# Patient Record
Sex: FEMALE | Race: WHITE | NOT HISPANIC OR LATINO | Employment: FULL TIME | URBAN - METROPOLITAN AREA
[De-identification: names, ages, dates, MRNs, and addresses within clinical notes are randomized per-mention and may not be internally consistent; named-entity substitution may affect disease eponyms.]

---

## 2023-07-01 LAB
ESTIMATED AVERAGE GLUCOSE FOR HBA1C: 160 MG/DL
HEMOGLOBIN A1C/HEMOGLOBIN TOTAL IN BLOOD: 7.2 %

## 2023-12-30 ENCOUNTER — LAB (OUTPATIENT)
Dept: LAB | Facility: LAB | Age: 58
End: 2023-12-30
Payer: COMMERCIAL

## 2023-12-30 DIAGNOSIS — E10.9 TYPE 1 DIABETES MELLITUS WITHOUT COMPLICATIONS (MULTI): Primary | ICD-10-CM

## 2023-12-30 PROCEDURE — 83036 HEMOGLOBIN GLYCOSYLATED A1C: CPT

## 2023-12-30 PROCEDURE — 36415 COLL VENOUS BLD VENIPUNCTURE: CPT

## 2023-12-31 LAB
EST. AVERAGE GLUCOSE BLD GHB EST-MCNC: 186 MG/DL
HBA1C MFR BLD: 8.1 %

## 2024-01-18 ENCOUNTER — OFFICE VISIT (OUTPATIENT)
Dept: ENDOCRINOLOGY | Facility: CLINIC | Age: 59
End: 2024-01-18
Payer: COMMERCIAL

## 2024-01-18 VITALS
RESPIRATION RATE: 20 BRPM | HEIGHT: 62 IN | WEIGHT: 230.16 LBS | DIASTOLIC BLOOD PRESSURE: 82 MMHG | HEART RATE: 96 BPM | SYSTOLIC BLOOD PRESSURE: 173 MMHG | BODY MASS INDEX: 42.35 KG/M2

## 2024-01-18 DIAGNOSIS — E10.9 TYPE 1 DIABETES MELLITUS WITHOUT COMPLICATION (MULTI): Primary | ICD-10-CM

## 2024-01-18 PROCEDURE — 1036F TOBACCO NON-USER: CPT | Performed by: INTERNAL MEDICINE

## 2024-01-18 PROCEDURE — 99214 OFFICE O/P EST MOD 30 MIN: CPT | Performed by: INTERNAL MEDICINE

## 2024-01-18 PROCEDURE — 3079F DIAST BP 80-89 MM HG: CPT | Performed by: INTERNAL MEDICINE

## 2024-01-18 PROCEDURE — 3077F SYST BP >= 140 MM HG: CPT | Performed by: INTERNAL MEDICINE

## 2024-01-18 RX ORDER — SIMVASTATIN 20 MG/1
20 TABLET, FILM COATED ORAL
COMMUNITY
End: 2024-01-23 | Stop reason: ALTCHOICE

## 2024-01-18 RX ORDER — MULTIVIT-MINERALS/FOLIC ACID 200 MCG
TABLET,CHEWABLE ORAL
COMMUNITY
End: 2024-01-23 | Stop reason: ALTCHOICE

## 2024-01-18 RX ORDER — TIZANIDINE 2 MG/1
2 TABLET ORAL EVERY 8 HOURS
COMMUNITY
Start: 2023-07-10 | End: 2024-01-23 | Stop reason: ALTCHOICE

## 2024-01-18 RX ORDER — MULTIVITAMIN
1 TABLET ORAL DAILY
COMMUNITY
Start: 2017-08-17

## 2024-01-18 RX ORDER — ACETAMINOPHEN 325 MG/1
TABLET ORAL EVERY 4 HOURS PRN
COMMUNITY
Start: 2019-11-11

## 2024-01-18 RX ORDER — ROSUVASTATIN CALCIUM 10 MG/1
10 TABLET, COATED ORAL DAILY
COMMUNITY

## 2024-01-18 RX ORDER — PANTOPRAZOLE SODIUM 20 MG/1
20 TABLET, DELAYED RELEASE ORAL
COMMUNITY
End: 2024-01-23 | Stop reason: ALTCHOICE

## 2024-01-18 RX ORDER — TRAVOPROST 0.04 MG/ML
SOLUTION/ DROPS OPHTHALMIC EVERY 24 HOURS
COMMUNITY
Start: 2018-12-14

## 2024-01-18 ASSESSMENT — ENCOUNTER SYMPTOMS
VOMITING: 0
ENDOCRINE COMMENTS: AS ABOVE
CONSTIPATION: 0
DIARRHEA: 0
NAUSEA: 0
SINUS PAIN: 1
UNEXPECTED WEIGHT CHANGE: 0

## 2024-01-18 ASSESSMENT — COLUMBIA-SUICIDE SEVERITY RATING SCALE - C-SSRS
2. HAVE YOU ACTUALLY HAD ANY THOUGHTS OF KILLING YOURSELF?: NO
1. IN THE PAST MONTH, HAVE YOU WISHED YOU WERE DEAD OR WISHED YOU COULD GO TO SLEEP AND NOT WAKE UP?: NO
6. HAVE YOU EVER DONE ANYTHING, STARTED TO DO ANYTHING, OR PREPARED TO DO ANYTHING TO END YOUR LIFE?: NO

## 2024-01-18 ASSESSMENT — PATIENT HEALTH QUESTIONNAIRE - PHQ9
2. FEELING DOWN, DEPRESSED OR HOPELESS: NOT AT ALL
SUM OF ALL RESPONSES TO PHQ9 QUESTIONS 1 AND 2: 0
1. LITTLE INTEREST OR PLEASURE IN DOING THINGS: NOT AT ALL

## 2024-01-18 ASSESSMENT — PAIN SCALES - GENERAL: PAINLEVEL: 0-NO PAIN

## 2024-01-18 NOTE — PROGRESS NOTES
History Of Present Illness  Stephanie Pierre is a 58 y.o. female     Duration of type 1 diabetes mellitus:  50 years  Complications:  cardiovascular disease    Recurring sinusitis for the past 2 months    Humulin N 40 units BID  Humalog 30-40 units before meals.     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    Surgical History  She has a past surgical history that includes Eye surgery (2017); Other surgical history (2017);  section, classic (2017); Other surgical history (2021); MR angio head wo IV contrast (11/10/2019); MR angio neck wo IV contrast (11/10/2019); CT angio head w and wo IV contrast (2023); and CT angio neck (2023).     Social History  She reports that she has never smoked. She has never used smokeless tobacco. She reports that she does not drink alcohol and does not use drugs.    Family History  No family history on file.    Medications  Current Outpatient Medications   Medication Instructions    acetaminophen (Tylenol) 325 mg tablet oral, Every 4 hours PRN    ascorbic acid (VITAMIN C) 100 mg, oral, Daily RT    aspirin (ASPIR-81 ORAL) Aspir-81    FOLIC ACID ORAL oral, Daily RT    metoprolol succinate XL (Kapspargo Sprinkle) 25 mg 24 hr capsule Every 12 hours    multivit with min-folic acid (One-A-Day VitaCraves) 200 mcg tablet,chewable Orally    multivitamin tablet 1 tablet, oral, Daily    pantoprazole (PROTONIX) 20 mg, oral, Daily RT    rosuvastatin (CRESTOR) 10 mg, oral, Daily    simvastatin (ZOCOR) 20 mg, oral, Daily RT    tiZANidine (ZANAFLEX) 2 mg, oral, Every 8 hours     "Travatan Z 0.004 % drops ophthalmic solution Every 24 hours       Allergies  Patient has no known allergies.    Review of Systems   Constitutional:  Negative for unexpected weight change (loss).   HENT:  Positive for sinus pain.    Eyes:  Negative for visual disturbance.   Cardiovascular:  Negative for chest pain.   Gastrointestinal:  Negative for constipation, diarrhea, nausea and vomiting.   Endocrine:        As above         Last Recorded Vitals  Blood pressure 173/82, pulse 96, resp. rate 20, height 1.575 m (5' 2\"), weight 104 kg (230 lb 2.6 oz).    Physical Exam  Constitutional:       General: She is not in acute distress.  HENT:      Head: Normocephalic.      Mouth/Throat:      Mouth: Mucous membranes are moist.   Eyes:      Extraocular Movements: Extraocular movements intact.   Neck:      Thyroid: No thyromegaly.   Cardiovascular:      Pulses:           Radial pulses are 2+ on the right side and 2+ on the left side.   Musculoskeletal:      Right lower leg: No edema.      Left lower leg: No edema.   Lymphadenopathy:      Cervical: No cervical adenopathy.   Neurological:      Mental Status: She is alert.      Motor: No tremor.   Psychiatric:         Mood and Affect: Affect normal.          Relevant Results  Glucose (mg/dL)   Date Value   07/09/2023 72 (L)   05/15/2021 175 (H)   02/29/2020 30 (LL)     Hemoglobin A1C (%)   Date Value   12/30/2023 8.1 (H)   07/01/2023 7.2 (A)   01/28/2023 6.6 (A)   07/18/2022 6.9 (A)     Bicarbonate (mmol/L)   Date Value   07/09/2023 32   05/15/2021 26   02/29/2020 31     Urea Nitrogen (mg/dL)   Date Value   07/09/2023 18   05/15/2021 20   02/29/2020 15     Creatinine (mg/dL)   Date Value   07/09/2023 0.75   05/15/2021 0.73   02/29/2020 0.65     Lab Results   Component Value Date    CHOL 146 01/14/2023    CHOL 149 05/15/2021    CHOL 152 02/29/2020     Lab Results   Component Value Date    HDL 52.4 01/14/2023    HDL 56.7 05/15/2021    HDL 55.2 02/29/2020     Lab Results   Component " Value Date    TRIG 115 01/14/2023    TRIG 149 05/15/2021    TRIG 151 (H) 02/29/2020     Lab Results   Component Value Date    TSH 2.25 05/15/2021       IMPRESSION  TYPE 1 DIABETES MELLITUS  Rising A1c with recent sinus infection    RECOMMENDATIONS  Continue current program  Labs as ordered by Dr. Dominguez  Bring written glucose record (2 weeks' worth) to all appointments.

## 2024-01-18 NOTE — PATIENT INSTRUCTIONS
RECOMMENDATIONS  Continue current program  Labs as ordered by Dr. Dominguez  Bring written glucose record (2 weeks' worth) to all appointments.

## 2024-01-23 ENCOUNTER — OFFICE VISIT (OUTPATIENT)
Dept: CARDIOLOGY | Facility: HOSPITAL | Age: 59
End: 2024-01-23
Payer: COMMERCIAL

## 2024-01-23 VITALS
BODY MASS INDEX: 42.58 KG/M2 | DIASTOLIC BLOOD PRESSURE: 59 MMHG | HEART RATE: 97 BPM | SYSTOLIC BLOOD PRESSURE: 147 MMHG | HEIGHT: 62 IN | WEIGHT: 231.4 LBS | OXYGEN SATURATION: 100 %

## 2024-01-23 DIAGNOSIS — I25.10 CORONARY ARTERY DISEASE, UNSPECIFIED VESSEL OR LESION TYPE, UNSPECIFIED WHETHER ANGINA PRESENT, UNSPECIFIED WHETHER NATIVE OR TRANSPLANTED HEART: Primary | ICD-10-CM

## 2024-01-23 LAB
ATRIAL RATE: 97 BPM
P AXIS: 62 DEGREES
P OFFSET: 199 MS
P ONSET: 151 MS
PR INTERVAL: 136 MS
Q ONSET: 219 MS
QRS COUNT: 16 BEATS
QRS DURATION: 76 MS
QT INTERVAL: 352 MS
QTC CALCULATION(BAZETT): 447 MS
QTC FREDERICIA: 413 MS
R AXIS: 47 DEGREES
T AXIS: 45 DEGREES
T OFFSET: 395 MS
VENTRICULAR RATE: 97 BPM

## 2024-01-23 PROCEDURE — 93005 ELECTROCARDIOGRAM TRACING: CPT | Performed by: NURSE PRACTITIONER

## 2024-01-23 PROCEDURE — 99214 OFFICE O/P EST MOD 30 MIN: CPT | Performed by: NURSE PRACTITIONER

## 2024-01-23 PROCEDURE — 93010 ELECTROCARDIOGRAM REPORT: CPT | Performed by: INTERNAL MEDICINE

## 2024-01-23 PROCEDURE — 1036F TOBACCO NON-USER: CPT | Performed by: NURSE PRACTITIONER

## 2024-01-23 RX ORDER — INSULIN LISPRO 100 [IU]/ML
INJECTION, SUSPENSION SUBCUTANEOUS
COMMUNITY

## 2024-01-23 RX ORDER — LISINOPRIL 2.5 MG/1
2.5 TABLET ORAL DAILY
COMMUNITY

## 2024-01-23 NOTE — PROGRESS NOTES
Subjective   Stephanie Pierre is a 58 y.o. female.    Chief Complaint:  Coronary Artery Disease    Mrs. Pierre returns for a 6 month follow up. She is seen in collaboration with Dr. Aragon. She has been feeling well from a cardiac standpoint. She has remained compliant with her medications, denying any intolerances. She remains fairly active with house work and walking, denying any exertional chest pain or shortness of breath. She denies any recent ER visits or hospitalizations. She offers no specific cardiovascular complaints or concerns today. She denies any complaints of chest pain, shortness of breath, lightheadedness, dizziness, palpitations, syncope, orthopnea, paroxysmal nocturnal dyspnea, lower extremity swelling or bleeding concerns.          Review of Systems   All other systems reviewed and are negative.      Objective   Physical Exam  Constitutional:       Appearance: Healthy appearance. In no distress  Pulmonary:      Effort: Pulmonary effort is normal.      Breath sounds: Normal breath sounds.   Cardiovascular:      Normal rate. Regular rhythm. Normal S1. Normal S2.       Murmurs: There is no murmur.      Carotids: right carotid pulse +2, no bruit heard over the right carotid. left carotid pulse +2, no bruit heard over the left carotid.  Edema:     Peripheral edema absent.   Abdominal:      Palpations: Abdomen is soft.   Musculoskeletal:       Cervical back: Normal range of motion.   Skin:     General: Skin is warm and dry. Normal color and pigmentation   Neurological:      Mental Status: Alert and oriented to person, place and time.   Psychiatric:     Mood and Affect: appropriate mood and appropriate affect.     EKG obtained and reviewed. Normal sinus rhythm. Anterior infarct, age undetermined. HR 97    Lab Review:   Lab Results   Component Value Date     07/09/2023    K 4.7 07/09/2023    CL 97 (L) 07/09/2023    CO2 32 07/09/2023    BUN 18 07/09/2023    CREATININE 0.75 07/09/2023    GLUCOSE  72 (L) 07/09/2023    CALCIUM 10.1 07/09/2023     Lab Results   Component Value Date    WBC 11.6 (H) 07/09/2023    HGB 14.6 07/09/2023    HCT 43.8 07/09/2023    MCV 93 07/09/2023     07/09/2023     Lab Results   Component Value Date    CHOL 146 01/14/2023    TRIG 115 01/14/2023    HDL 52.4 01/14/2023       Assessment/Plan   Mrs. Pierre is a 58 year old  female with a past medical history significant for hyperlipidemia, Type I Diabetes and CAD/NSTEMI s/p RCA stent 9/2018. Echocardiogram 9/2018 showed a hyperdynamic LV with an EF of 70% and no significant valvular disease. She presents today for routine follow up stable from a cardiac standpoint. Her VS and EKG remain stable. She remains on appropriate antiplatelet and lipid lowering therapy with her LDL at goal. She seems to be getting around reasonably well, denying any exertional intolerances. I will have her continue all medications unchanged. She will follow up with us in clinic in one year. She knows to call for any concerns.

## 2024-04-04 ENCOUNTER — APPOINTMENT (OUTPATIENT)
Dept: ENDOCRINOLOGY | Facility: CLINIC | Age: 59
End: 2024-04-04
Payer: COMMERCIAL

## 2024-04-27 ENCOUNTER — LAB (OUTPATIENT)
Dept: LAB | Facility: LAB | Age: 59
End: 2024-04-27
Payer: COMMERCIAL

## 2024-04-27 DIAGNOSIS — E10.39: ICD-10-CM

## 2024-04-27 DIAGNOSIS — E10.59 TYPE 1 DIABETES MELLITUS WITH OTHER CIRCULATORY COMPLICATIONS (MULTI): ICD-10-CM

## 2024-04-27 DIAGNOSIS — I25.10 ATHEROSCLEROTIC HEART DISEASE OF NATIVE CORONARY ARTERY WITHOUT ANGINA PECTORIS: Primary | ICD-10-CM

## 2024-04-27 DIAGNOSIS — E78.5 HYPERLIPIDEMIA, UNSPECIFIED: ICD-10-CM

## 2024-04-27 PROCEDURE — 80061 LIPID PANEL: CPT

## 2024-04-27 PROCEDURE — 83036 HEMOGLOBIN GLYCOSYLATED A1C: CPT

## 2024-04-27 PROCEDURE — 80053 COMPREHEN METABOLIC PANEL: CPT

## 2024-04-27 PROCEDURE — 85025 COMPLETE CBC W/AUTO DIFF WBC: CPT

## 2024-04-27 PROCEDURE — 36415 COLL VENOUS BLD VENIPUNCTURE: CPT

## 2024-04-27 PROCEDURE — 83721 ASSAY OF BLOOD LIPOPROTEIN: CPT

## 2024-04-28 LAB
ALBUMIN SERPL BCP-MCNC: 4.2 G/DL (ref 3.4–5)
ALP SERPL-CCNC: 89 U/L (ref 33–110)
ALT SERPL W P-5'-P-CCNC: 20 U/L (ref 7–45)
ANION GAP SERPL CALC-SCNC: 15 MMOL/L (ref 10–20)
AST SERPL W P-5'-P-CCNC: 16 U/L (ref 9–39)
BASOPHILS # BLD AUTO: 0.02 X10*3/UL (ref 0–0.1)
BASOPHILS NFR BLD AUTO: 0.2 %
BILIRUB SERPL-MCNC: 0.5 MG/DL (ref 0–1.2)
BUN SERPL-MCNC: 21 MG/DL (ref 6–23)
CALCIUM SERPL-MCNC: 9.6 MG/DL (ref 8.6–10.6)
CHLORIDE SERPL-SCNC: 98 MMOL/L (ref 98–107)
CHOLEST SERPL-MCNC: 154 MG/DL (ref 0–199)
CHOLESTEROL/HDL RATIO: 2.8
CO2 SERPL-SCNC: 29 MMOL/L (ref 21–32)
CREAT SERPL-MCNC: 0.73 MG/DL (ref 0.5–1.05)
EGFRCR SERPLBLD CKD-EPI 2021: >90 ML/MIN/1.73M*2
EOSINOPHIL # BLD AUTO: 0.11 X10*3/UL (ref 0–0.7)
EOSINOPHIL NFR BLD AUTO: 1.3 %
ERYTHROCYTE [DISTWIDTH] IN BLOOD BY AUTOMATED COUNT: 12 % (ref 11.5–14.5)
EST. AVERAGE GLUCOSE BLD GHB EST-MCNC: 209 MG/DL
GLUCOSE SERPL-MCNC: 256 MG/DL (ref 74–99)
HBA1C MFR BLD: 8.9 %
HCT VFR BLD AUTO: 42.8 % (ref 36–46)
HDLC SERPL-MCNC: 55.6 MG/DL
HGB BLD-MCNC: 14.4 G/DL (ref 12–16)
IMM GRANULOCYTES # BLD AUTO: 0.03 X10*3/UL (ref 0–0.7)
IMM GRANULOCYTES NFR BLD AUTO: 0.3 % (ref 0–0.9)
LDLC SERPL CALC-MCNC: 73 MG/DL
LDLC SERPL DIRECT ASSAY-MCNC: 82 MG/DL (ref 0–129)
LYMPHOCYTES # BLD AUTO: 2.23 X10*3/UL (ref 1.2–4.8)
LYMPHOCYTES NFR BLD AUTO: 26 %
MCH RBC QN AUTO: 31.4 PG (ref 26–34)
MCHC RBC AUTO-ENTMCNC: 33.6 G/DL (ref 32–36)
MCV RBC AUTO: 93 FL (ref 80–100)
MONOCYTES # BLD AUTO: 0.68 X10*3/UL (ref 0.1–1)
MONOCYTES NFR BLD AUTO: 7.9 %
NEUTROPHILS # BLD AUTO: 5.51 X10*3/UL (ref 1.2–7.7)
NEUTROPHILS NFR BLD AUTO: 64.3 %
NON HDL CHOLESTEROL: 98 MG/DL (ref 0–149)
NRBC BLD-RTO: 0 /100 WBCS (ref 0–0)
PLATELET # BLD AUTO: 266 X10*3/UL (ref 150–450)
POTASSIUM SERPL-SCNC: 5.1 MMOL/L (ref 3.5–5.3)
PROT SERPL-MCNC: 7.2 G/DL (ref 6.4–8.2)
RBC # BLD AUTO: 4.58 X10*6/UL (ref 4–5.2)
SODIUM SERPL-SCNC: 137 MMOL/L (ref 136–145)
TRIGL SERPL-MCNC: 128 MG/DL (ref 0–149)
VLDL: 26 MG/DL (ref 0–40)
WBC # BLD AUTO: 8.6 X10*3/UL (ref 4.4–11.3)

## 2024-05-04 ENCOUNTER — LAB (OUTPATIENT)
Dept: LAB | Facility: LAB | Age: 59
End: 2024-05-04
Payer: COMMERCIAL

## 2024-05-04 LAB
CREAT UR-MCNC: 90.4 MG/DL (ref 20–320)
MICROALBUMIN UR-MCNC: 68.2 MG/L
MICROALBUMIN/CREAT UR: 75.4 UG/MG CREAT

## 2024-05-04 PROCEDURE — 82043 UR ALBUMIN QUANTITATIVE: CPT

## 2024-05-04 PROCEDURE — 82570 ASSAY OF URINE CREATININE: CPT

## 2024-06-15 DIAGNOSIS — E10.9 TYPE 1 DIABETES MELLITUS WITHOUT COMPLICATION (MULTI): Primary | ICD-10-CM

## 2024-06-18 ENCOUNTER — HOSPITAL ENCOUNTER (EMERGENCY)
Facility: HOSPITAL | Age: 59
Discharge: HOME | End: 2024-06-18
Attending: EMERGENCY MEDICINE
Payer: COMMERCIAL

## 2024-06-18 VITALS
BODY MASS INDEX: 44.16 KG/M2 | WEIGHT: 240 LBS | HEIGHT: 62 IN | DIASTOLIC BLOOD PRESSURE: 79 MMHG | HEART RATE: 106 BPM | TEMPERATURE: 97.5 F | OXYGEN SATURATION: 95 % | SYSTOLIC BLOOD PRESSURE: 169 MMHG | RESPIRATION RATE: 17 BRPM

## 2024-06-18 DIAGNOSIS — K52.9 GASTROENTERITIS: Primary | ICD-10-CM

## 2024-06-18 LAB
ALBUMIN SERPL BCP-MCNC: 4.2 G/DL (ref 3.4–5)
ALP SERPL-CCNC: 113 U/L (ref 33–110)
ALT SERPL W P-5'-P-CCNC: 36 U/L (ref 7–45)
ANION GAP SERPL CALC-SCNC: 14 MMOL/L (ref 10–20)
AST SERPL W P-5'-P-CCNC: 37 U/L (ref 9–39)
BASE EXCESS BLDV CALC-SCNC: 2.5 MMOL/L (ref -2–3)
BASOPHILS # BLD AUTO: 0.02 X10*3/UL (ref 0–0.1)
BASOPHILS NFR BLD AUTO: 0.1 %
BILIRUB SERPL-MCNC: 0.6 MG/DL (ref 0–1.2)
BODY TEMPERATURE: 37 DEGREES CELSIUS
BUN SERPL-MCNC: 18 MG/DL (ref 6–23)
CALCIUM SERPL-MCNC: 9 MG/DL (ref 8.6–10.3)
CHLORIDE SERPL-SCNC: 98 MMOL/L (ref 98–107)
CO2 SERPL-SCNC: 29 MMOL/L (ref 21–32)
CREAT SERPL-MCNC: 0.75 MG/DL (ref 0.5–1.05)
EGFRCR SERPLBLD CKD-EPI 2021: >90 ML/MIN/1.73M*2
EOSINOPHIL # BLD AUTO: 0.1 X10*3/UL (ref 0–0.7)
EOSINOPHIL NFR BLD AUTO: 0.7 %
ERYTHROCYTE [DISTWIDTH] IN BLOOD BY AUTOMATED COUNT: 12.2 % (ref 11.5–14.5)
GLUCOSE BLD MANUAL STRIP-MCNC: 120 MG/DL (ref 74–99)
GLUCOSE BLD MANUAL STRIP-MCNC: 185 MG/DL (ref 74–99)
GLUCOSE SERPL-MCNC: 126 MG/DL (ref 74–99)
HCO3 BLDV-SCNC: 27.9 MMOL/L (ref 22–26)
HCT VFR BLD AUTO: 45.2 % (ref 36–46)
HGB BLD-MCNC: 15.4 G/DL (ref 12–16)
IMM GRANULOCYTES # BLD AUTO: 0.06 X10*3/UL (ref 0–0.7)
IMM GRANULOCYTES NFR BLD AUTO: 0.4 % (ref 0–0.9)
INHALED O2 CONCENTRATION: 21 %
LYMPHOCYTES # BLD AUTO: 0.61 X10*3/UL (ref 1.2–4.8)
LYMPHOCYTES NFR BLD AUTO: 4.4 %
MCH RBC QN AUTO: 32.3 PG (ref 26–34)
MCHC RBC AUTO-ENTMCNC: 34.1 G/DL (ref 32–36)
MCV RBC AUTO: 95 FL (ref 80–100)
MONOCYTES # BLD AUTO: 0.65 X10*3/UL (ref 0.1–1)
MONOCYTES NFR BLD AUTO: 4.7 %
NEUTROPHILS # BLD AUTO: 12.3 X10*3/UL (ref 1.2–7.7)
NEUTROPHILS NFR BLD AUTO: 89.7 %
NRBC BLD-RTO: 0 /100 WBCS (ref 0–0)
OXYHGB MFR BLDV: 74 % (ref 45–75)
PCO2 BLDV: 45 MM HG (ref 41–51)
PH BLDV: 7.4 PH (ref 7.33–7.43)
PLATELET # BLD AUTO: 260 X10*3/UL (ref 150–450)
PO2 BLDV: 46 MM HG (ref 35–45)
POTASSIUM SERPL-SCNC: 4.6 MMOL/L (ref 3.5–5.3)
PROT SERPL-MCNC: 7.5 G/DL (ref 6.4–8.2)
RBC # BLD AUTO: 4.77 X10*6/UL (ref 4–5.2)
SAO2 % BLDV: 76 % (ref 45–75)
SODIUM SERPL-SCNC: 136 MMOL/L (ref 136–145)
WBC # BLD AUTO: 13.7 X10*3/UL (ref 4.4–11.3)

## 2024-06-18 PROCEDURE — 80053 COMPREHEN METABOLIC PANEL: CPT | Performed by: NURSE PRACTITIONER

## 2024-06-18 PROCEDURE — 96374 THER/PROPH/DIAG INJ IV PUSH: CPT

## 2024-06-18 PROCEDURE — 82805 BLOOD GASES W/O2 SATURATION: CPT | Performed by: NURSE PRACTITIONER

## 2024-06-18 PROCEDURE — 99284 EMERGENCY DEPT VISIT MOD MDM: CPT | Mod: 25

## 2024-06-18 PROCEDURE — 85025 COMPLETE CBC W/AUTO DIFF WBC: CPT | Performed by: NURSE PRACTITIONER

## 2024-06-18 PROCEDURE — 36415 COLL VENOUS BLD VENIPUNCTURE: CPT | Performed by: NURSE PRACTITIONER

## 2024-06-18 PROCEDURE — 2500000004 HC RX 250 GENERAL PHARMACY W/ HCPCS (ALT 636 FOR OP/ED): Performed by: EMERGENCY MEDICINE

## 2024-06-18 PROCEDURE — 82947 ASSAY GLUCOSE BLOOD QUANT: CPT

## 2024-06-18 PROCEDURE — 96361 HYDRATE IV INFUSION ADD-ON: CPT

## 2024-06-18 RX ORDER — INSULIN HUMAN 100 [IU]/ML
INJECTION, SUSPENSION SUBCUTANEOUS
Qty: 90 ML | Refills: 3 | Status: SHIPPED | OUTPATIENT
Start: 2024-06-18

## 2024-06-18 RX ORDER — ONDANSETRON HYDROCHLORIDE 2 MG/ML
4 INJECTION, SOLUTION INTRAVENOUS ONCE
Status: COMPLETED | OUTPATIENT
Start: 2024-06-18 | End: 2024-06-18

## 2024-06-18 ASSESSMENT — COLUMBIA-SUICIDE SEVERITY RATING SCALE - C-SSRS
2. HAVE YOU ACTUALLY HAD ANY THOUGHTS OF KILLING YOURSELF?: NO
6. HAVE YOU EVER DONE ANYTHING, STARTED TO DO ANYTHING, OR PREPARED TO DO ANYTHING TO END YOUR LIFE?: NO
1. IN THE PAST MONTH, HAVE YOU WISHED YOU WERE DEAD OR WISHED YOU COULD GO TO SLEEP AND NOT WAKE UP?: NO

## 2024-06-18 ASSESSMENT — PAIN - FUNCTIONAL ASSESSMENT: PAIN_FUNCTIONAL_ASSESSMENT: 0-10

## 2024-06-18 ASSESSMENT — PAIN SCALES - GENERAL: PAINLEVEL_OUTOF10: 0 - NO PAIN

## 2024-06-18 NOTE — ED PROVIDER NOTES
HPI   Chief Complaint   Patient presents with    Diarrhea    Abdominal Pain       This is a 58-year-old female who presents to the emergency department complaining of diarrhea.  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  The patient reports nausea but no vomiting.  Her diarrhea has been nonbloody.  She denies fevers and chills.  She denies abdominal pain.  She is concerned about her diabetes as she is not able to eat due to the nausea.    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, antibodies, or inhibitors (Multi)     Factor V inhibitor disorder    Personal history of other diseases of the circulatory system     History of arterial occlusion     Past Surgical History:   Procedure Laterality Date     SECTION, CLASSIC  2017     Section    CT ANGIO NECK  2023    CT NECK ANGIO W AND WO IV CONTRAST 2023 [facility] CT    CT HEAD ANGIO W AND WO IV CONTRAST  2023    CT HEAD ANGIO W AND WO IV CONTRAST 2023 [facility] CT    EYE SURGERY  2017    Eye Surgery    MR HEAD ANGIO WO IV CONTRAST  11/10/2019    MR HEAD ANGIO WO IV CONTRAST 11/10/2019 [facility]    MR NECK ANGIO WO IV CONTRAST  11/10/2019    MR NECK ANGIO WO IV CONTRAST 11/10/2019 [facility]    OTHER SURGICAL HISTORY  2017    Oral Surgery Tooth Extraction [facility]    OTHER SURGICAL HISTORY  2021    Cholecystectomy     No family history on file.  Social History     Tobacco Use    Smoking status: Never    Smokeless tobacco: Never   Substance Use Topics    Alcohol use: Never    Drug use: Never       Physical Exam   ED Triage Vitals [24 1619]    Temperature Heart Rate Respirations BP   36.4 °C (97.5 °F) (!) 113 18 (!) 183/102      Pulse Ox Temp Source Heart Rate Source Patient Position   97 % Tympanic Monitor Sitting      BP Location FiO2 (%)     Left arm --       Physical Exam  Vitals and nursing note reviewed.   HENT:      Head: Normocephalic and atraumatic.      Nose: Nose normal.   Eyes:      Conjunctiva/sclera: Conjunctivae normal.   Cardiovascular:      Rate and Rhythm: Normal rate and regular rhythm.      Pulses: Normal pulses.      Heart sounds: Normal heart sounds.   Pulmonary:      Effort: Pulmonary effort is normal.      Breath sounds: Normal breath sounds.   Abdominal:      General: Bowel sounds are normal.      Palpations: Abdomen is soft.   Musculoskeletal:         General: Normal range of motion.      Cervical back: Normal range of motion and neck supple.   Skin:     Findings: No rash.   Neurological:      General: No focal deficit present.      Mental Status: She is alert and oriented to person, place, and time.   Psychiatric:         Mood and Affect: Mood normal.         ED Course & MDM   Diagnoses as of 06/18/24 2152   Gastroenteritis       Medical Decision Making  Differential diagnosis: I have considered the following conditions during my assessment of this patient's condition: Gastritis, gastroenteritis, GERD, food poisoning, ileus, bowel obstruction, hernia, acute appendicitis, cholecystitis, diverticulitis, colitis, renal colic.    This is a 58-year-old diabetic who presents emergency department with diarrhea.  She has no abdominal pain.  She has nausea but no vomiting.  She will be treated with IV fluids and Zofran.  Labs show no evidence of DKA.  pH is normal at 7.40.  Anion gap is normal at 14.  Bicarb is normal at 29.  The patient's white blood count is elevated at 13.7.  She has no abdominal tenderness.  Doubt acute bacterial infection.  The patient felt much improved after IV fluids.  She was able to tolerate p.o. in the  emergency department.  She is appropriate for discharge and outpatient follow-up.        Procedure  Procedures     Norman Pearl MD  06/18/24 1182

## 2024-06-18 NOTE — ED TRIAGE NOTES
Pt states that she was around kids that had a stomach bug yesterday. Pt states that she started having diarrhea around 11:30am, pt states that she is nauseated and unable to eat.

## 2024-06-18 NOTE — ED TRIAGE NOTES
TRIAGE NOTE   I saw the patient as the Clinician in Triage and performed a brief history and physical exam, established acuity, and ordered appropriate tests to develop basic plan of care. Patient will be seen by an [facility], resident and/or physician who will independently evaluate the patient. Please see subsequent provider notes for further details and disposition.     Brief HPI: In brief, Stephanie Pierre is a 58 y.o. female that presents for diarrhea.  Type I diabetic.  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  She started with diarrhea today.  States blood sugar at home 188.  Denies abdominal pain, vomiting or fevers.    Focused Physical exam:   Sinus Tachycardia at 115  RRR  Abdomen soft and nondistended    Plan/MDM:   CBC  CMP   urinalysis   VBG      Please see subsequent provider note for further details and disposition

## 2024-06-18 NOTE — Clinical Note
Stephanie Pierre was seen and treated in our emergency department on 6/18/2024.  She may return to work on 06/19/2024.       If you have any questions or concerns, please don't hesitate to call.      Norman Pearl MD

## 2024-07-23 ENCOUNTER — TELEPHONE (OUTPATIENT)
Dept: ENDOCRINOLOGY | Facility: CLINIC | Age: 59
End: 2024-07-23
Payer: COMMERCIAL

## 2024-07-23 DIAGNOSIS — E10.9 TYPE 1 DIABETES MELLITUS WITHOUT COMPLICATION (MULTI): Primary | ICD-10-CM

## 2024-07-23 NOTE — TELEPHONE ENCOUNTER
Pt called office to verify if A1c Lab needed to be completed prior to fuv on 8/1/24. Provider verbally confirmed A1c to be completed on/after 7/27/24 and before office visit.

## 2024-07-27 ENCOUNTER — LAB (OUTPATIENT)
Dept: LAB | Facility: LAB | Age: 59
End: 2024-07-27
Payer: COMMERCIAL

## 2024-07-27 DIAGNOSIS — E10.9 TYPE 1 DIABETES MELLITUS WITHOUT COMPLICATION (MULTI): ICD-10-CM

## 2024-07-27 LAB
EST. AVERAGE GLUCOSE BLD GHB EST-MCNC: 206 MG/DL
HBA1C MFR BLD: 8.8 %

## 2024-07-27 PROCEDURE — 36415 COLL VENOUS BLD VENIPUNCTURE: CPT

## 2024-07-27 PROCEDURE — 83036 HEMOGLOBIN GLYCOSYLATED A1C: CPT

## 2024-08-01 ENCOUNTER — APPOINTMENT (OUTPATIENT)
Dept: ENDOCRINOLOGY | Facility: CLINIC | Age: 59
End: 2024-08-01
Payer: COMMERCIAL

## 2024-08-01 VITALS
BODY MASS INDEX: 41.53 KG/M2 | SYSTOLIC BLOOD PRESSURE: 161 MMHG | HEART RATE: 112 BPM | DIASTOLIC BLOOD PRESSURE: 76 MMHG | WEIGHT: 227.07 LBS

## 2024-08-01 DIAGNOSIS — E10.9 TYPE 1 DIABETES MELLITUS WITHOUT COMPLICATION (MULTI): Primary | ICD-10-CM

## 2024-08-01 PROCEDURE — 3048F LDL-C <100 MG/DL: CPT | Performed by: INTERNAL MEDICINE

## 2024-08-01 PROCEDURE — 1036F TOBACCO NON-USER: CPT | Performed by: INTERNAL MEDICINE

## 2024-08-01 PROCEDURE — 3078F DIAST BP <80 MM HG: CPT | Performed by: INTERNAL MEDICINE

## 2024-08-01 PROCEDURE — 99214 OFFICE O/P EST MOD 30 MIN: CPT | Performed by: INTERNAL MEDICINE

## 2024-08-01 PROCEDURE — 3060F POS MICROALBUMINURIA REV: CPT | Performed by: INTERNAL MEDICINE

## 2024-08-01 PROCEDURE — 3052F HG A1C>EQUAL 8.0%<EQUAL 9.0%: CPT | Performed by: INTERNAL MEDICINE

## 2024-08-01 PROCEDURE — 3077F SYST BP >= 140 MM HG: CPT | Performed by: INTERNAL MEDICINE

## 2024-08-01 PROCEDURE — 4010F ACE/ARB THERAPY RXD/TAKEN: CPT | Performed by: INTERNAL MEDICINE

## 2024-08-01 ASSESSMENT — ENCOUNTER SYMPTOMS
ARTHRALGIAS: 0
APPETITE CHANGE: 0
HEADACHES: 0
POLYDIPSIA: 0
SHORTNESS OF BREATH: 0
VOMITING: 0
FREQUENCY: 0
NERVOUS/ANXIOUS: 0
COUGH: 0
FEVER: 0
CONSTIPATION: 0
ABDOMINAL PAIN: 0
SORE THROAT: 0
NAUSEA: 1
DIARRHEA: 1

## 2024-08-01 NOTE — LETTER
2024     Victor Hugo Dominguez MD  [address]  [address]  [place] [address]    Patient: Stephanie Pierre   YOB: 1965   Date of Visit: 2024       Dear Dr. Victor Hugo Dominguez MD:    Thank you for referring Stephanie Pierre to me for evaluation. Below are my notes for this consultation.  If you have questions, please do not hesitate to call me. I look forward to following your patient along with you.       Sincerely,     Hunter Rodriguez MD      CC: No Recipients  ______________________________________________________________________________________    History Of Present Illness  Stephanie Pierre is a 58 y.o. female     Duration of type 1 diabetes mellitus:  50 years  Complications:  cardiovascular disease     History of GI illness, seen at [facility] ED in   Recurring sinusitis    Humulin N   Breakfast 40 units  Bedtime 42-44 units    Humalog 30-40 units before meals.      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    Surgical History  She has a past surgical history that includes Eye surgery (2017); Other surgical history (2017);  section, classic (2017); Other surgical history (2021); MR angio head wo IV contrast (11/10/2019); MR angio neck wo IV contrast (11/10/2019); CT angio head w and wo IV contrast (2023); and CT angio neck (2023).     Social History  She reports that she has never smoked. She has never used smokeless tobacco. She reports that she does not drink alcohol and does not use drugs.    Family History  No family history on file.    Medications  Current  Outpatient Medications   Medication Instructions   • acetaminophen (Tylenol) 325 mg tablet oral, Every 4 hours PRN   • aspirin (ASPIR-81 ORAL) Aspir-81   • HumuLIN N NPH U-100 Insulin 100 unit/mL injection INJECT 40 UNITS SUBCUTANEOUSLY TWICE DAILY   • insulin lispro protamin-lispro (HumaLOG Mix 50-50 KwikPen) 100 unit/mL (50-50) injection subcutaneous, 2 times daily (morning and late afternoon), Take as directed per insulin instructions.   • insulin NPH hum/reg insulin hm (HUMULIN 50/50 SUBQ) subcutaneous, Take as directed per insulin instructions.   • lisinopril 2.5 mg, oral, Daily   • metoprolol succinate XL (KAPSPARGO SPRINKLE) 12.5 mg, oral, Every 12 hours   • multivitamin tablet 1 tablet, oral, Daily   • rosuvastatin (CRESTOR) 10 mg, oral, Daily   • Travatan Z 0.004 % drops ophthalmic solution Every 24 hours       Allergies  Benzoin, Cefaclor, Clarithromycin, Clindamycin, Pseudoephedrine, Sulfamethoxazole-trimethoprim, Brimonidine-timolol, Insulin glargine, Penicillins, Phenobarbital, and Phenytoin    Review of Systems   Constitutional:  Negative for appetite change and fever.   HENT:  Negative for sore throat.         Denies dry mouth   Eyes:  Negative for visual disturbance.   Respiratory:  Negative for cough and shortness of breath.    Cardiovascular:  Negative for chest pain.   Gastrointestinal:  Positive for diarrhea and nausea (resolved). Negative for abdominal pain, constipation and vomiting.   Endocrine: Negative for polydipsia and polyuria.   Genitourinary:  Negative for frequency.   Musculoskeletal:  Negative for arthralgias.   Skin:  Negative for rash.   Neurological:  Negative for headaches.   Psychiatric/Behavioral:  The patient is not nervous/anxious.          Last Recorded Vitals  Blood pressure 161/76, pulse (!) 112, weight 103 kg (227 lb 1.2 oz).    Physical Exam  Constitutional:       General: She is not in acute distress.     Appearance: She is obese.   HENT:      Head: Normocephalic.       Mouth/Throat:      Mouth: Mucous membranes are moist.   Eyes:      Extraocular Movements: Extraocular movements intact.   Neck:      Thyroid: No thyroid mass or thyromegaly.   Cardiovascular:      Pulses:           Radial pulses are 2+ on the right side and 2+ on the left side.   Musculoskeletal:      Right lower leg: No edema.      Left lower leg: No edema.   Lymphadenopathy:      Cervical: No cervical adenopathy.   Neurological:      Mental Status: She is alert.      Motor: No tremor.   Psychiatric:         Mood and Affect: Affect normal.          Relevant Results  Glucose (mg/dL)   Date Value   06/18/2024 126 (H)   04/27/2024 256 (H)   07/09/2023 72 (L)   05/15/2021 175 (H)   02/29/2020 30 (LL)     Hemoglobin A1C (%)   Date Value   07/27/2024 8.8 (H)   04/27/2024 8.9 (H)   12/30/2023 8.1 (H)     Bicarbonate (mmol/L)   Date Value   06/18/2024 29   04/27/2024 29   07/09/2023 32   05/15/2021 26   02/29/2020 31     Urea Nitrogen (mg/dL)   Date Value   06/18/2024 18   04/27/2024 21   07/09/2023 18   05/15/2021 20   02/29/2020 15     Creatinine (mg/dL)   Date Value   06/18/2024 0.75   04/27/2024 0.73   07/09/2023 0.75   05/15/2021 0.73   02/29/2020 0.65     Lab Results   Component Value Date    CHOL 154 04/27/2024    CHOL 146 01/14/2023    CHOL 149 05/15/2021     Lab Results   Component Value Date    HDL 55.6 04/27/2024    HDL 52.4 01/14/2023    HDL 56.7 05/15/2021     Lab Results   Component Value Date    LDLCALC 73 04/27/2024     Lab Results   Component Value Date    TRIG 128 04/27/2024    TRIG 115 01/14/2023    TRIG 149 05/15/2021     Lab Results   Component Value Date    TSH 2.25 05/15/2021       IMPRESSION  TYPE 1 DIABETES MELLITUS WITH HYPERGLYCEMIA  A1c not improved  Stated FBG elevation per patient recall  Recent infections    RECOMMENDATIONS  Increase night NPH dose to 48 units  If fasting glucose is over 160 for 5 days, increase night NPH another 2 units.    Follow up 3 months  Bring written glucose record (2  weeks' worth) to all appointments.     A1c before next appointment

## 2024-08-01 NOTE — PROGRESS NOTES
History Of Present Illness  Stephanie Pierre is a 58 y.o. female     Duration of type 1 diabetes mellitus:  50 years  Complications:  cardiovascular disease     History of GI illness, seen at [facility] ED in   Recurring sinusitis    Humulin N   Breakfast 40 units  Bedtime 42-44 units    Humalog 30-40 units before meals.      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    Surgical History  She has a past surgical history that includes Eye surgery (2017); Other surgical history (2017);  section, classic (2017); Other surgical history (2021); MR angio head wo IV contrast (11/10/2019); MR angio neck wo IV contrast (11/10/2019); CT angio head w and wo IV contrast (2023); and CT angio neck (2023).     Social History  She reports that she has never smoked. She has never used smokeless tobacco. She reports that she does not drink alcohol and does not use drugs.    Family History  No family history on file.    Medications  Current Outpatient Medications   Medication Instructions    acetaminophen (Tylenol) 325 mg tablet oral, Every 4 hours PRN    aspirin (ASPIR-81 ORAL) Aspir-81    HumuLIN N NPH U-100 Insulin 100 unit/mL injection INJECT 40 UNITS SUBCUTANEOUSLY TWICE DAILY    insulin lispro protamin-lispro (HumaLOG Mix 50-50 KwikPen) 100 unit/mL (50-50) injection subcutaneous, 2 times daily (morning and late afternoon), Take as directed per insulin instructions.    insulin NPH hum/reg insulin hm (HUMULIN 50/50 SUBQ) subcutaneous, Take as directed per insulin instructions.    lisinopril 2.5 mg, oral, Daily     metoprolol succinate XL (KAPSPARGO SPRINKLE) 12.5 mg, oral, Every 12 hours    multivitamin tablet 1 tablet, oral, Daily    rosuvastatin (CRESTOR) 10 mg, oral, Daily    Travatan Z 0.004 % drops ophthalmic solution Every 24 hours       Allergies  Benzoin, Cefaclor, Clarithromycin, Clindamycin, Pseudoephedrine, Sulfamethoxazole-trimethoprim, Brimonidine-timolol, Insulin glargine, Penicillins, Phenobarbital, and Phenytoin    Review of Systems   Constitutional:  Negative for appetite change and fever.   HENT:  Negative for sore throat.         Denies dry mouth   Eyes:  Negative for visual disturbance.   Respiratory:  Negative for cough and shortness of breath.    Cardiovascular:  Negative for chest pain.   Gastrointestinal:  Positive for diarrhea and nausea (resolved). Negative for abdominal pain, constipation and vomiting.   Endocrine: Negative for polydipsia and polyuria.   Genitourinary:  Negative for frequency.   Musculoskeletal:  Negative for arthralgias.   Skin:  Negative for rash.   Neurological:  Negative for headaches.   Psychiatric/Behavioral:  The patient is not nervous/anxious.          Last Recorded Vitals  Blood pressure 161/76, pulse (!) 112, weight 103 kg (227 lb 1.2 oz).    Physical Exam  Constitutional:       General: She is not in acute distress.     Appearance: She is obese.   HENT:      Head: Normocephalic.      Mouth/Throat:      Mouth: Mucous membranes are moist.   Eyes:      Extraocular Movements: Extraocular movements intact.   Neck:      Thyroid: No thyroid mass or thyromegaly.   Cardiovascular:      Pulses:           Radial pulses are 2+ on the right side and 2+ on the left side.   Musculoskeletal:      Right lower leg: No edema.      Left lower leg: No edema.   Lymphadenopathy:      Cervical: No cervical adenopathy.   Neurological:      Mental Status: She is alert.      Motor: No tremor.   Psychiatric:         Mood and Affect: Affect normal.          Relevant Results  Glucose (mg/dL)   Date  Value   06/18/2024 126 (H)   04/27/2024 256 (H)   07/09/2023 72 (L)   05/15/2021 175 (H)   02/29/2020 30 (LL)     Hemoglobin A1C (%)   Date Value   07/27/2024 8.8 (H)   04/27/2024 8.9 (H)   12/30/2023 8.1 (H)     Bicarbonate (mmol/L)   Date Value   06/18/2024 29   04/27/2024 29   07/09/2023 32   05/15/2021 26   02/29/2020 31     Urea Nitrogen (mg/dL)   Date Value   06/18/2024 18   04/27/2024 21   07/09/2023 18   05/15/2021 20   02/29/2020 15     Creatinine (mg/dL)   Date Value   06/18/2024 0.75   04/27/2024 0.73   07/09/2023 0.75   05/15/2021 0.73   02/29/2020 0.65     Lab Results   Component Value Date    CHOL 154 04/27/2024    CHOL 146 01/14/2023    CHOL 149 05/15/2021     Lab Results   Component Value Date    HDL 55.6 04/27/2024    HDL 52.4 01/14/2023    HDL 56.7 05/15/2021     Lab Results   Component Value Date    LDLCALC 73 04/27/2024     Lab Results   Component Value Date    TRIG 128 04/27/2024    TRIG 115 01/14/2023    TRIG 149 05/15/2021     Lab Results   Component Value Date    TSH 2.25 05/15/2021       IMPRESSION  TYPE 1 DIABETES MELLITUS WITH HYPERGLYCEMIA  A1c not improved  Stated FBG elevation per patient recall  Recent infections    RECOMMENDATIONS  Increase night NPH dose to 48 units  If fasting glucose is over 160 for 5 days, increase night NPH another 2 units.    Follow up 3 months  Bring written glucose record (2 weeks' worth) to all appointments.     A1c before next appointment

## 2024-08-01 NOTE — PATIENT INSTRUCTIONS
RECOMMENDATIONS  Increase night NPH dose to 48 units  If fasting glucose is over 160 for 5 days, increase night NPH another 2 units.    Follow up 3 months  Bring written glucose record (2 weeks' worth) to all appointments.     A1c before next appointment

## 2024-08-24 DIAGNOSIS — I25.10 ATHEROSCLEROTIC HEART DISEASE OF NATIVE CORONARY ARTERY WITHOUT ANGINA PECTORIS: ICD-10-CM

## 2024-08-26 RX ORDER — METOPROLOL TARTRATE 25 MG/1
12.5 TABLET, FILM COATED ORAL 2 TIMES DAILY
Qty: 90 TABLET | Refills: 3 | Status: SHIPPED | OUTPATIENT
Start: 2024-08-26 | End: 2025-08-26

## 2024-08-31 DIAGNOSIS — E10.9 TYPE 1 DIABETES MELLITUS WITHOUT COMPLICATION (MULTI): Primary | ICD-10-CM

## 2024-08-31 RX ORDER — INSULIN LISPRO 100 [IU]/ML
INJECTION, SOLUTION INTRAVENOUS; SUBCUTANEOUS
Qty: 120 ML | Refills: 3 | Status: SHIPPED | OUTPATIENT
Start: 2024-08-31

## 2024-11-16 ENCOUNTER — LAB (OUTPATIENT)
Dept: LAB | Facility: LAB | Age: 59
End: 2024-11-16
Payer: COMMERCIAL

## 2024-11-16 DIAGNOSIS — E10.9 TYPE 1 DIABETES MELLITUS WITHOUT COMPLICATION: ICD-10-CM

## 2024-11-16 PROCEDURE — 83036 HEMOGLOBIN GLYCOSYLATED A1C: CPT

## 2024-11-16 PROCEDURE — 36415 COLL VENOUS BLD VENIPUNCTURE: CPT

## 2024-11-17 LAB
EST. AVERAGE GLUCOSE BLD GHB EST-MCNC: 194 MG/DL
HBA1C MFR BLD: 8.4 %

## 2024-11-21 ENCOUNTER — APPOINTMENT (OUTPATIENT)
Dept: ENDOCRINOLOGY | Facility: CLINIC | Age: 59
End: 2024-11-21
Payer: COMMERCIAL

## 2024-11-21 VITALS
WEIGHT: 233.69 LBS | HEART RATE: 98 BPM | BODY MASS INDEX: 42.74 KG/M2 | DIASTOLIC BLOOD PRESSURE: 73 MMHG | SYSTOLIC BLOOD PRESSURE: 153 MMHG

## 2024-11-21 DIAGNOSIS — E10.9 TYPE 1 DIABETES MELLITUS WITHOUT COMPLICATION: ICD-10-CM

## 2024-11-21 PROCEDURE — 3060F POS MICROALBUMINURIA REV: CPT | Performed by: INTERNAL MEDICINE

## 2024-11-21 PROCEDURE — 3052F HG A1C>EQUAL 8.0%<EQUAL 9.0%: CPT | Performed by: INTERNAL MEDICINE

## 2024-11-21 PROCEDURE — 1036F TOBACCO NON-USER: CPT | Performed by: INTERNAL MEDICINE

## 2024-11-21 PROCEDURE — 4010F ACE/ARB THERAPY RXD/TAKEN: CPT | Performed by: INTERNAL MEDICINE

## 2024-11-21 PROCEDURE — 3078F DIAST BP <80 MM HG: CPT | Performed by: INTERNAL MEDICINE

## 2024-11-21 PROCEDURE — 3077F SYST BP >= 140 MM HG: CPT | Performed by: INTERNAL MEDICINE

## 2024-11-21 PROCEDURE — 99214 OFFICE O/P EST MOD 30 MIN: CPT | Performed by: INTERNAL MEDICINE

## 2024-11-21 PROCEDURE — 3048F LDL-C <100 MG/DL: CPT | Performed by: INTERNAL MEDICINE

## 2024-11-21 RX ORDER — INSULIN LISPRO 100 [IU]/ML
30-40 INJECTION, SOLUTION INTRAVENOUS; SUBCUTANEOUS
Qty: 110 ML | Refills: 3 | Status: SHIPPED | OUTPATIENT
Start: 2024-11-21

## 2024-11-21 RX ORDER — INSULIN HUMAN 100 [IU]/ML
40-48 INJECTION, SUSPENSION SUBCUTANEOUS 2 TIMES DAILY
Qty: 80 ML | Refills: 3 | Status: SHIPPED | OUTPATIENT
Start: 2024-11-21 | End: 2025-11-21

## 2024-11-21 ASSESSMENT — ENCOUNTER SYMPTOMS
DIARRHEA: 0
CONSTIPATION: 0
VOMITING: 0
APPETITE CHANGE: 0
HEADACHES: 0
ABDOMINAL PAIN: 0
NERVOUS/ANXIOUS: 0
SORE THROAT: 0
NAUSEA: 0
SHORTNESS OF BREATH: 0
COUGH: 0
ARTHRALGIAS: 0
POLYDIPSIA: 0
FREQUENCY: 0
FEVER: 0

## 2024-11-21 NOTE — LETTER
2024     Victor Hugo Dominguez MD  [address]  [address]  [place] [address]    Patient: Stephanie Pierre   YOB: 1965   Date of Visit: 2024       Dear Dr. Victor Hugo Dominguez MD:    Thank you for referring Stephanie Pierre to me for evaluation. Below are my notes for this consultation.  If you have questions, please do not hesitate to call me. I look forward to following your patient along with you.       Sincerely,     Hunter Rdoriguez MD      CC: No Recipients  ______________________________________________________________________________________    History Of Present Illness  Stephanie Pierre is a 59 y.o. female     Duration of type 1 diabetes mellitus:  50 years  Complications:  cardiovascular disease    Work stress, day care    Humulin N   Breakfast 40 units  Bedtime 46-48 units     Humalog 30-40 units before meals.      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    Surgical History  She has a past surgical history that includes Eye surgery (2017); Other surgical history (2017);  section, classic (2017); Other surgical history (2021); MR angio head wo IV contrast (11/10/2019); MR angio neck wo IV contrast (11/10/2019); CT angio head w and wo IV contrast (2023); and CT angio neck (2023).     Social History  She reports that she has never smoked. She has never used smokeless tobacco. She reports that she does not drink alcohol and does not use drugs.    Family History  No family history on file.    Medications  Current Outpatient Medications    Medication Instructions   • acetaminophen (Tylenol) 325 mg tablet Every 4 hours PRN   • aspirin (ASPIR-81 ORAL) Aspir-81   • HumaLOG U-100 Insulin 100 unit/mL injection INJECT 30-40 UNITS UNDER THE SKIN THREE TIMES A DAY AS DIRECTED   • HumuLIN N NPH U-100 Insulin 100 unit/mL injection INJECT 40 UNITS SUBCUTANEOUSLY TWICE DAILY   • insulin lispro protamin-lispro (HumaLOG Mix 50-50 KwikPen) 100 unit/mL (50-50) injection 2 times daily (morning and late afternoon)   • insulin NPH hum/reg insulin hm (HUMULIN 50/50 SUBQ) Inject under the skin. Take as directed per insulin instructions.   • lisinopril 2.5 mg, Daily   • metoprolol succinate XL (KAPSPARGO SPRINKLE) 12.5 mg, Every 12 hours   • metoprolol tartrate (LOPRESSOR) 12.5 mg, oral, 2 times daily   • multivitamin tablet 1 tablet, Daily   • rosuvastatin (CRESTOR) 10 mg, Daily   • Travatan Z 0.004 % drops ophthalmic solution Every 24 hours       Allergies  Benzoin, Cefaclor, Clarithromycin, Clindamycin, Pseudoephedrine, Sulfamethoxazole-trimethoprim, Brimonidine-timolol, Insulin glargine, Penicillins, Phenobarbital, and Phenytoin    Review of Systems   Constitutional:  Negative for appetite change and fever.   HENT:  Negative for sore throat.         Denies dry mouth   Eyes:  Negative for visual disturbance.   Respiratory:  Negative for cough and shortness of breath.    Cardiovascular:  Negative for chest pain.   Gastrointestinal:  Negative for abdominal pain, constipation, diarrhea, nausea and vomiting.   Endocrine: Negative for polydipsia and polyuria.   Genitourinary:  Negative for frequency.   Musculoskeletal:  Negative for arthralgias.   Skin:  Negative for rash.   Neurological:  Negative for headaches.   Psychiatric/Behavioral:  The patient is not nervous/anxious.          Last Recorded Vitals  Blood pressure 153/73, pulse 98, weight 106 kg (233 lb 11 oz).    Physical Exam  Constitutional:       General: She is not in acute distress.     Appearance: She is  obese.   HENT:      Head: Normocephalic.      Mouth/Throat:      Mouth: Mucous membranes are moist.   Eyes:      Extraocular Movements: Extraocular movements intact.   Neck:      Thyroid: No thyroid mass or thyromegaly.   Cardiovascular:      Pulses:           Radial pulses are 2+ on the right side and 2+ on the left side.   Musculoskeletal:      Right lower leg: No edema.      Left lower leg: No edema.   Lymphadenopathy:      Cervical: No cervical adenopathy.   Neurological:      Mental Status: She is alert.      Motor: No tremor.   Psychiatric:         Mood and Affect: Affect normal.          Relevant Results  Glucose (mg/dL)   Date Value   06/18/2024 126 (H)   04/27/2024 256 (H)   07/09/2023 72 (L)   05/15/2021 175 (H)   02/29/2020 30 (LL)     Hemoglobin A1C (%)   Date Value   11/16/2024 8.4 (H)   07/27/2024 8.8 (H)   04/27/2024 8.9 (H)     Bicarbonate (mmol/L)   Date Value   06/18/2024 29   04/27/2024 29   07/09/2023 32   05/15/2021 26   02/29/2020 31     Urea Nitrogen (mg/dL)   Date Value   06/18/2024 18   04/27/2024 21   07/09/2023 18   05/15/2021 20   02/29/2020 15     Creatinine (mg/dL)   Date Value   06/18/2024 0.75   04/27/2024 0.73   07/09/2023 0.75   05/15/2021 0.73   02/29/2020 0.65       IMPRESSION  TYPE 1 DIABETES MELLITUS  A1c improving  No glucose records provided      RECOMMENDATIONS  Follow up 3 months  Bring written glucose record (2 weeks' worth) to all appointments.      A1c before next appointment

## 2024-11-21 NOTE — PATIENT INSTRUCTIONS
RECOMMENDATIONS  Follow up 3 months  Bring written glucose record (2 weeks' worth) to all appointments.      A1c before next appointment

## 2024-11-21 NOTE — PROGRESS NOTES
History Of Present Illness  Stephanie Pierre is a 59 y.o. female     Duration of type 1 diabetes mellitus:  50 years  Complications:  cardiovascular disease    Work stress, day care    Humulin N   Breakfast 40 units  Bedtime 46-48 units     Humalog 30-40 units before meals.      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    Surgical History  She has a past surgical history that includes Eye surgery (2017); Other surgical history (2017);  section, classic (2017); Other surgical history (2021); MR angio head wo IV contrast (11/10/2019); MR angio neck wo IV contrast (11/10/2019); CT angio head w and wo IV contrast (2023); and CT angio neck (2023).     Social History  She reports that she has never smoked. She has never used smokeless tobacco. She reports that she does not drink alcohol and does not use drugs.    Family History  No family history on file.    Medications  Current Outpatient Medications   Medication Instructions    acetaminophen (Tylenol) 325 mg tablet Every 4 hours PRN    aspirin (ASPIR-81 ORAL) Aspir-81    HumaLOG U-100 Insulin 100 unit/mL injection INJECT 30-40 UNITS UNDER THE SKIN THREE TIMES A DAY AS DIRECTED    HumuLIN N NPH U-100 Insulin 100 unit/mL injection INJECT 40 UNITS SUBCUTANEOUSLY TWICE DAILY    insulin lispro protamin-lispro (HumaLOG Mix 50-50 KwikPen) 100 unit/mL (50-50) injection 2 times daily (morning and late afternoon)    insulin NPH hum/reg insulin hm (HUMULIN 50/50 SUBQ) Inject under the skin. Take as directed per insulin instructions.     lisinopril 2.5 mg, Daily    metoprolol succinate XL (KAPSPARGO SPRINKLE) 12.5 mg, Every 12 hours    metoprolol tartrate (LOPRESSOR) 12.5 mg, oral, 2 times daily    multivitamin tablet 1 tablet, Daily    rosuvastatin (CRESTOR) 10 mg, Daily    Travatan Z 0.004 % drops ophthalmic solution Every 24 hours       Allergies  Benzoin, Cefaclor, Clarithromycin, Clindamycin, Pseudoephedrine, Sulfamethoxazole-trimethoprim, Brimonidine-timolol, Insulin glargine, Penicillins, Phenobarbital, and Phenytoin    Review of Systems   Constitutional:  Negative for appetite change and fever.   HENT:  Negative for sore throat.         Denies dry mouth   Eyes:  Negative for visual disturbance.   Respiratory:  Negative for cough and shortness of breath.    Cardiovascular:  Negative for chest pain.   Gastrointestinal:  Negative for abdominal pain, constipation, diarrhea, nausea and vomiting.   Endocrine: Negative for polydipsia and polyuria.   Genitourinary:  Negative for frequency.   Musculoskeletal:  Negative for arthralgias.   Skin:  Negative for rash.   Neurological:  Negative for headaches.   Psychiatric/Behavioral:  The patient is not nervous/anxious.          Last Recorded Vitals  Blood pressure 153/73, pulse 98, weight 106 kg (233 lb 11 oz).    Physical Exam  Constitutional:       General: She is not in acute distress.     Appearance: She is obese.   HENT:      Head: Normocephalic.      Mouth/Throat:      Mouth: Mucous membranes are moist.   Eyes:      Extraocular Movements: Extraocular movements intact.   Neck:      Thyroid: No thyroid mass or thyromegaly.   Cardiovascular:      Pulses:           Radial pulses are 2+ on the right side and 2+ on the left side.   Musculoskeletal:      Right lower leg: No edema.      Left lower leg: No edema.   Lymphadenopathy:      Cervical: No cervical adenopathy.   Neurological:      Mental Status: She is alert.      Motor: No tremor.   Psychiatric:         Mood and Affect: Affect normal.           Relevant Results  Glucose (mg/dL)   Date Value   06/18/2024 126 (H)   04/27/2024 256 (H)   07/09/2023 72 (L)   05/15/2021 175 (H)   02/29/2020 30 (LL)     Hemoglobin A1C (%)   Date Value   11/16/2024 8.4 (H)   07/27/2024 8.8 (H)   04/27/2024 8.9 (H)     Bicarbonate (mmol/L)   Date Value   06/18/2024 29   04/27/2024 29   07/09/2023 32   05/15/2021 26   02/29/2020 31     Urea Nitrogen (mg/dL)   Date Value   06/18/2024 18   04/27/2024 21   07/09/2023 18   05/15/2021 20   02/29/2020 15     Creatinine (mg/dL)   Date Value   06/18/2024 0.75   04/27/2024 0.73   07/09/2023 0.75   05/15/2021 0.73   02/29/2020 0.65       IMPRESSION  TYPE 1 DIABETES MELLITUS  A1c improving  No glucose records provided      RECOMMENDATIONS  Follow up 3 months  Bring written glucose record (2 weeks' worth) to all appointments.      A1c before next appointment

## 2024-12-01 ENCOUNTER — APPOINTMENT (OUTPATIENT)
Dept: CARDIOLOGY | Facility: HOSPITAL | Age: 59
End: 2024-12-01
Payer: COMMERCIAL

## 2024-12-01 ENCOUNTER — HOSPITAL ENCOUNTER (EMERGENCY)
Facility: HOSPITAL | Age: 59
Discharge: HOME | End: 2024-12-01
Attending: EMERGENCY MEDICINE
Payer: COMMERCIAL

## 2024-12-01 ENCOUNTER — APPOINTMENT (OUTPATIENT)
Dept: RADIOLOGY | Facility: HOSPITAL | Age: 59
End: 2024-12-01
Payer: COMMERCIAL

## 2024-12-01 VITALS
OXYGEN SATURATION: 99 % | WEIGHT: 240 LBS | TEMPERATURE: 97.7 F | DIASTOLIC BLOOD PRESSURE: 52 MMHG | BODY MASS INDEX: 44.16 KG/M2 | HEART RATE: 95 BPM | SYSTOLIC BLOOD PRESSURE: 144 MMHG | HEIGHT: 62 IN | RESPIRATION RATE: 17 BRPM

## 2024-12-01 DIAGNOSIS — R74.8 ELEVATED LIVER ENZYMES: Primary | ICD-10-CM

## 2024-12-01 DIAGNOSIS — N39.0 URINARY TRACT INFECTION WITHOUT HEMATURIA, SITE UNSPECIFIED: ICD-10-CM

## 2024-12-01 DIAGNOSIS — R11.2 NAUSEA AND VOMITING, UNSPECIFIED VOMITING TYPE: ICD-10-CM

## 2024-12-01 LAB
ALBUMIN SERPL BCP-MCNC: 4 G/DL (ref 3.4–5)
ALP SERPL-CCNC: 103 U/L (ref 33–110)
ALT SERPL W P-5'-P-CCNC: 79 U/L (ref 7–45)
ANION GAP BLDV CALCULATED.4IONS-SCNC: 5 MMOL/L (ref 10–25)
ANION GAP SERPL CALC-SCNC: 13 MMOL/L (ref 10–20)
APPEARANCE UR: ABNORMAL
AST SERPL W P-5'-P-CCNC: 130 U/L (ref 9–39)
BACTERIA #/AREA URNS AUTO: ABNORMAL /HPF
BASE EXCESS BLDV CALC-SCNC: 6.7 MMOL/L (ref -2–3)
BASOPHILS # BLD AUTO: 0.02 X10*3/UL (ref 0–0.1)
BASOPHILS NFR BLD AUTO: 0.1 %
BILIRUB SERPL-MCNC: 0.5 MG/DL (ref 0–1.2)
BILIRUB UR STRIP.AUTO-MCNC: NEGATIVE MG/DL
BODY TEMPERATURE: 37 DEGREES CELSIUS
BUN SERPL-MCNC: 20 MG/DL (ref 6–23)
CA-I BLDV-SCNC: 1.2 MMOL/L (ref 1.1–1.33)
CALCIUM SERPL-MCNC: 8.4 MG/DL (ref 8.6–10.3)
CARDIAC TROPONIN I PNL SERPL HS: 3 NG/L (ref 0–13)
CARDIAC TROPONIN I PNL SERPL HS: 3 NG/L (ref 0–13)
CHLORIDE BLDV-SCNC: 100 MMOL/L (ref 98–107)
CHLORIDE SERPL-SCNC: 92 MMOL/L (ref 98–107)
CO2 SERPL-SCNC: 29 MMOL/L (ref 21–32)
COLOR UR: YELLOW
CREAT SERPL-MCNC: 0.77 MG/DL (ref 0.5–1.05)
EGFRCR SERPLBLD CKD-EPI 2021: 90 ML/MIN/1.73M*2
EOSINOPHIL # BLD AUTO: 0.02 X10*3/UL (ref 0–0.7)
EOSINOPHIL NFR BLD AUTO: 0.1 %
ERYTHROCYTE [DISTWIDTH] IN BLOOD BY AUTOMATED COUNT: 11.9 % (ref 11.5–14.5)
FLUAV RNA RESP QL NAA+PROBE: NOT DETECTED
FLUBV RNA RESP QL NAA+PROBE: NOT DETECTED
GLUCOSE BLD MANUAL STRIP-MCNC: 94 MG/DL (ref 74–99)
GLUCOSE BLDV-MCNC: 139 MG/DL (ref 74–99)
GLUCOSE SERPL-MCNC: 125 MG/DL (ref 74–99)
GLUCOSE UR STRIP.AUTO-MCNC: ABNORMAL MG/DL
HCO3 BLDV-SCNC: 33.4 MMOL/L (ref 22–26)
HCT VFR BLD AUTO: 44.3 % (ref 36–46)
HCT VFR BLD EST: 47 % (ref 36–46)
HGB BLD-MCNC: 14.8 G/DL (ref 12–16)
HGB BLDV-MCNC: 15.8 G/DL (ref 12–16)
HOLD SPECIMEN: NORMAL
IMM GRANULOCYTES # BLD AUTO: 0.07 X10*3/UL (ref 0–0.7)
IMM GRANULOCYTES NFR BLD AUTO: 0.4 % (ref 0–0.9)
INHALED O2 CONCENTRATION: 22 %
KETONES UR STRIP.AUTO-MCNC: ABNORMAL MG/DL
LACTATE BLDV-SCNC: 2.2 MMOL/L (ref 0.4–2)
LACTATE SERPL-SCNC: 1.9 MMOL/L (ref 0.4–2)
LEUKOCYTE ESTERASE UR QL STRIP.AUTO: ABNORMAL
LIPASE SERPL-CCNC: 35 U/L (ref 9–82)
LYMPHOCYTES # BLD AUTO: 0.44 X10*3/UL (ref 1.2–4.8)
LYMPHOCYTES NFR BLD AUTO: 2.6 %
MAGNESIUM SERPL-MCNC: 1.81 MG/DL (ref 1.6–2.4)
MCH RBC QN AUTO: 31.2 PG (ref 26–34)
MCHC RBC AUTO-ENTMCNC: 33.4 G/DL (ref 32–36)
MCV RBC AUTO: 94 FL (ref 80–100)
MONOCYTES # BLD AUTO: 0.91 X10*3/UL (ref 0.1–1)
MONOCYTES NFR BLD AUTO: 5.5 %
MUCOUS THREADS #/AREA URNS AUTO: ABNORMAL /LPF
NEUTROPHILS # BLD AUTO: 15.22 X10*3/UL (ref 1.2–7.7)
NEUTROPHILS NFR BLD AUTO: 91.3 %
NITRITE UR QL STRIP.AUTO: NEGATIVE
NRBC BLD-RTO: 0 /100 WBCS (ref 0–0)
OXYHGB MFR BLDV: 51.1 % (ref 45–75)
PCO2 BLDV: 54 MM HG (ref 41–51)
PH BLDV: 7.4 PH (ref 7.33–7.43)
PH UR STRIP.AUTO: 5.5 [PH]
PLATELET # BLD AUTO: 265 X10*3/UL (ref 150–450)
PO2 BLDV: 33 MM HG (ref 35–45)
POTASSIUM BLDV-SCNC: 4.6 MMOL/L (ref 3.5–5.3)
POTASSIUM SERPL-SCNC: 4.2 MMOL/L (ref 3.5–5.3)
PROT SERPL-MCNC: 7.5 G/DL (ref 6.4–8.2)
PROT UR STRIP.AUTO-MCNC: ABNORMAL MG/DL
RBC # BLD AUTO: 4.74 X10*6/UL (ref 4–5.2)
RBC # UR STRIP.AUTO: ABNORMAL /UL
RBC #/AREA URNS AUTO: ABNORMAL /HPF
SAO2 % BLDV: 52 % (ref 45–75)
SARS-COV-2 RNA RESP QL NAA+PROBE: NOT DETECTED
SODIUM BLDV-SCNC: 134 MMOL/L (ref 136–145)
SODIUM SERPL-SCNC: 130 MMOL/L (ref 136–145)
SP GR UR STRIP.AUTO: 1.03
SQUAMOUS #/AREA URNS AUTO: ABNORMAL /HPF
UROBILINOGEN UR STRIP.AUTO-MCNC: NORMAL MG/DL
WBC # BLD AUTO: 16.7 X10*3/UL (ref 4.4–11.3)
WBC #/AREA URNS AUTO: ABNORMAL /HPF

## 2024-12-01 PROCEDURE — 2500000004 HC RX 250 GENERAL PHARMACY W/ HCPCS (ALT 636 FOR OP/ED): Performed by: EMERGENCY MEDICINE

## 2024-12-01 PROCEDURE — 87086 URINE CULTURE/COLONY COUNT: CPT | Mod: AHULAB | Performed by: EMERGENCY MEDICINE

## 2024-12-01 PROCEDURE — 82947 ASSAY GLUCOSE BLOOD QUANT: CPT

## 2024-12-01 PROCEDURE — 83605 ASSAY OF LACTIC ACID: CPT | Performed by: EMERGENCY MEDICINE

## 2024-12-01 PROCEDURE — 99285 EMERGENCY DEPT VISIT HI MDM: CPT | Mod: 25

## 2024-12-01 PROCEDURE — 96374 THER/PROPH/DIAG INJ IV PUSH: CPT

## 2024-12-01 PROCEDURE — 84132 ASSAY OF SERUM POTASSIUM: CPT | Performed by: EMERGENCY MEDICINE

## 2024-12-01 PROCEDURE — 36415 COLL VENOUS BLD VENIPUNCTURE: CPT | Performed by: EMERGENCY MEDICINE

## 2024-12-01 PROCEDURE — 74177 CT ABD & PELVIS W/CONTRAST: CPT

## 2024-12-01 PROCEDURE — 83690 ASSAY OF LIPASE: CPT | Performed by: EMERGENCY MEDICINE

## 2024-12-01 PROCEDURE — 87636 SARSCOV2 & INF A&B AMP PRB: CPT | Performed by: EMERGENCY MEDICINE

## 2024-12-01 PROCEDURE — 85025 COMPLETE CBC W/AUTO DIFF WBC: CPT | Performed by: EMERGENCY MEDICINE

## 2024-12-01 PROCEDURE — 74177 CT ABD & PELVIS W/CONTRAST: CPT | Mod: FOREIGN READ | Performed by: RADIOLOGY

## 2024-12-01 PROCEDURE — 84484 ASSAY OF TROPONIN QUANT: CPT | Performed by: EMERGENCY MEDICINE

## 2024-12-01 PROCEDURE — 93005 ELECTROCARDIOGRAM TRACING: CPT

## 2024-12-01 PROCEDURE — 2550000001 HC RX 255 CONTRASTS: Performed by: EMERGENCY MEDICINE

## 2024-12-01 PROCEDURE — 81003 URINALYSIS AUTO W/O SCOPE: CPT | Performed by: EMERGENCY MEDICINE

## 2024-12-01 PROCEDURE — 83735 ASSAY OF MAGNESIUM: CPT | Performed by: EMERGENCY MEDICINE

## 2024-12-01 PROCEDURE — 96361 HYDRATE IV INFUSION ADD-ON: CPT

## 2024-12-01 PROCEDURE — 2500000002 HC RX 250 W HCPCS SELF ADMINISTERED DRUGS (ALT 637 FOR MEDICARE OP, ALT 636 FOR OP/ED): Performed by: EMERGENCY MEDICINE

## 2024-12-01 PROCEDURE — 82947 ASSAY GLUCOSE BLOOD QUANT: CPT | Mod: 91

## 2024-12-01 PROCEDURE — 84132 ASSAY OF SERUM POTASSIUM: CPT | Mod: 59 | Performed by: EMERGENCY MEDICINE

## 2024-12-01 RX ORDER — NITROFURANTOIN 25; 75 MG/1; MG/1
100 CAPSULE ORAL EVERY 12 HOURS SCHEDULED
Status: DISCONTINUED | OUTPATIENT
Start: 2024-12-01 | End: 2024-12-01 | Stop reason: HOSPADM

## 2024-12-01 RX ORDER — NITROFURANTOIN 25; 75 MG/1; MG/1
100 CAPSULE ORAL EVERY 12 HOURS SCHEDULED
Status: DISCONTINUED | OUTPATIENT
Start: 2024-12-01 | End: 2024-12-01

## 2024-12-01 RX ORDER — NITROFURANTOIN 25; 75 MG/1; MG/1
100 CAPSULE ORAL 2 TIMES DAILY
Qty: 10 CAPSULE | Refills: 0 | Status: SHIPPED | OUTPATIENT
Start: 2024-12-01 | End: 2024-12-06

## 2024-12-01 RX ORDER — ONDANSETRON HYDROCHLORIDE 2 MG/ML
4 INJECTION, SOLUTION INTRAVENOUS ONCE
Status: COMPLETED | OUTPATIENT
Start: 2024-12-01 | End: 2024-12-01

## 2024-12-01 RX ORDER — ONDANSETRON 4 MG/1
4 TABLET, ORALLY DISINTEGRATING ORAL EVERY 8 HOURS PRN
Qty: 10 TABLET | Refills: 0 | Status: SHIPPED | OUTPATIENT
Start: 2024-12-01 | End: 2024-12-06

## 2024-12-01 ASSESSMENT — PAIN SCALES - GENERAL: PAINLEVEL_OUTOF10: 0 - NO PAIN

## 2024-12-01 ASSESSMENT — COLUMBIA-SUICIDE SEVERITY RATING SCALE - C-SSRS
1. IN THE PAST MONTH, HAVE YOU WISHED YOU WERE DEAD OR WISHED YOU COULD GO TO SLEEP AND NOT WAKE UP?: NO
2. HAVE YOU ACTUALLY HAD ANY THOUGHTS OF KILLING YOURSELF?: NO
6. HAVE YOU EVER DONE ANYTHING, STARTED TO DO ANYTHING, OR PREPARED TO DO ANYTHING TO END YOUR LIFE?: NO

## 2024-12-01 ASSESSMENT — PAIN - FUNCTIONAL ASSESSMENT: PAIN_FUNCTIONAL_ASSESSMENT: 0-10

## 2024-12-01 NOTE — DISCHARGE INSTRUCTIONS
You were seen emergent today for evaluation of nausea vomiting diarrhea.  Your labs were overall reassuring with exception of elevated AST at 130 and ALT 79.  Please follow-up with your primary care provider and gastroenterology regarding these elevated liver enzymes.  White blood cell count was also slightly elevated at 16.7 from last 13.7.  This may be related to the multiple episodes of nausea vomiting and diarrhea.  Your urine is concerning for possible urinary tract infection, please take antibiotics as prescribed.  There is some fatty infiltration of the liver which may be related to the liver enzymes.  A gastroenterology referral will be placed.  Please return to the emergency room if you have worsening symptoms or if you have any other concerns.

## 2024-12-01 NOTE — ED PROVIDER NOTES
History/Exam limitations: none.   Additional history was obtained from patient, relative(s), and past medical records.          HPI:    Stephanie Pierre is a 59 y.o. female PMH type 1 diabetes, hypertension, CAD presenting for evaluation of nausea vomiting diarrhea.  Patient states that starting around 4 PM today she began having nausea vomiting and diarrhea.  Episodes are nonbloody.  States that she has had multiple sick contacts Works at a .  No headache, vision changes, chest pain, shortness of breath.  No abdominal pain.  No recent travel or antibiotic use.  Glucose has been between 60s and 80s at home this evening.          Physical Exam:  ED Triage Vitals [12/01/24 0108]   Temperature Heart Rate Respirations BP   36.5 °C (97.7 °F) 98 20 141/58      Pulse Ox Temp Source Heart Rate Source Patient Position   97 % Temporal Monitor --      BP Location FiO2 (%)     -- --        GEN:      Alert, mildly fatigued.  Eyes:       PERRL, EOMI  HENT:      NC/AT, OP clear, airway patent, MM  CV:      RRR, no MRG, no LE pitting edema, 2+ radial and pedal pulses  PULM:     CTAB, no w/r/r, easy WOB, symmetric chest rise  ABD:      Soft, NT, ND, no masses, BS +  :       No CVA TTP  NEURO:   A&Ox3, no focal deficits    MSK:      FROM, no joint deformities or swelling, no e/o trauma  SKIN:       Warm and dry  PSYCH:    Appropriate mood and affect         MDM/ED Course:   Stephanie Pierre is a 59 y.o. female PMH type 1 diabetes, hypertension, CAD presenting for evaluation of nausea vomiting diarrhea.  Vitals and exam as documented above.  Differential clues viral gastroenteritis.  Unlikely ACS as cause of nausea given constellation of vomiting and diarrhea.  No risk factors for significant invasive bacterial illness, no recent antibiotics or travel.  No abdominal pain on assessment.  Differential does include diverticulitis, IBS, IBD.  Glucose was reassuring prior to arrival, patient is concerned about the possibility of  DKA.  On arrival glucose reassuring at 94.  VBG reassuring at 7.4/54, lactate 2.2 overall reassuring.  CBC with leukocytosis to 16.7.  Lactate normal 1.9.  Magnesium reassuring.  Troponin negative x 2.  Lipase negative unlikely pancreatitis.  Chemistry with mildly elevated liver enzymes with  and ALT 79.  Sodium mildly low at 130.  Patient was given IV fluids normal saline x 1 L.  Flu COVID-negative.  Given leukocytosis and quality of symptoms, elevated liver enzymes, CT obtained and no acute process, mild fatty infiltration of the liver, some fecal debris in colon, nonspecific right lower abdominal wall pelvic subcutaneous fat (no evidence of cellulitis patient injects insulin in this area likely related to that), disc disease L5-S1.  Patient is given IV Zofran.  Patient's nausea resolved.  Possible UTI, patient has a cefaclor allergy, Bactrim allergy, penicillin allergy, plan to treat with Macrobid given this.  Patient feels comfortable with discharge, is nontoxic-appearing and hemodynamically stable.  Advise close follow-up with PCP regarding symptoms and elevated liver enzymes.  Can discontinue Macrobid if urine culture negative.  Potentially related to fatty liver, potentially related to underlying GI illness.  Patient was explained findings, exam/study results, the importance of follow up and the plan moving forward; patient verbalized understanding and agreement. All questions were answered and no new questions at this time. Patient will be discharged with strict return precautions, follow up plan with PCP.    EKG reviewed by me: 2:39 AM normal sinus rhythm, rate 96, normal axis, normal intervals, no STEMI, no ectopy.     Diagnoses as of 12/03/24 1639   Elevated liver enzymes   Urinary tract infection without hematuria, site unspecified   Nausea and vomiting, unspecified vomiting type         Chronic medical conditions affecting care listed in MDM. I independently reviewed imaging studies and agreed  with radiology reads. I reviewed recent and relevant outside records including PCP notes, Prior discharge summaries, and prior radiology reports.    Procedure  Procedures    Diagnosis:   1.  Nausea vomiting and diarrhea  2.  Elevated liver enzymes  3.  Possible UTI    Dispo: Discharged in stable condition      Disclaimer: Portions of this note were dictated by speech recognition. An attempt at proof reading was made to minimize errors. Minor errors in transcription may be present.  Please call if questions.     Primo Burns MD  12/03/24 [id number]

## 2024-12-02 LAB — BACTERIA UR CULT: NORMAL

## 2024-12-05 LAB
ATRIAL RATE: 96 BPM
P AXIS: -7 DEGREES
P OFFSET: 199 MS
P ONSET: 148 MS
PR INTERVAL: 142 MS
Q ONSET: 219 MS
QRS COUNT: 16 BEATS
QRS DURATION: 76 MS
QT INTERVAL: 362 MS
QTC CALCULATION(BAZETT): 457 MS
QTC FREDERICIA: 423 MS
R AXIS: -1 DEGREES
T AXIS: 20 DEGREES
T OFFSET: 400 MS
VENTRICULAR RATE: 96 BPM

## 2025-01-13 ENCOUNTER — LAB (OUTPATIENT)
Dept: LAB | Facility: LAB | Age: 60
End: 2025-01-13
Payer: COMMERCIAL

## 2025-01-13 DIAGNOSIS — E87.1 HYPO-OSMOLALITY AND HYPONATREMIA: ICD-10-CM

## 2025-01-13 DIAGNOSIS — D72.829 ELEVATED WHITE BLOOD CELL COUNT, UNSPECIFIED: Primary | ICD-10-CM

## 2025-01-13 DIAGNOSIS — R79.89 OTHER SPECIFIED ABNORMAL FINDINGS OF BLOOD CHEMISTRY: ICD-10-CM

## 2025-01-13 PROCEDURE — 82977 ASSAY OF GGT: CPT

## 2025-01-13 PROCEDURE — 82728 ASSAY OF FERRITIN: CPT

## 2025-01-13 PROCEDURE — 80053 COMPREHEN METABOLIC PANEL: CPT

## 2025-01-13 PROCEDURE — 82150 ASSAY OF AMYLASE: CPT

## 2025-01-13 PROCEDURE — 85025 COMPLETE CBC W/AUTO DIFF WBC: CPT

## 2025-01-14 LAB
ALBUMIN SERPL BCP-MCNC: 4.2 G/DL (ref 3.4–5)
ALP SERPL-CCNC: 118 U/L (ref 33–110)
ALT SERPL W P-5'-P-CCNC: 24 U/L (ref 7–45)
AMYLASE SERPL-CCNC: 27 U/L (ref 29–103)
ANION GAP SERPL CALC-SCNC: 14 MMOL/L (ref 10–20)
AST SERPL W P-5'-P-CCNC: 23 U/L (ref 9–39)
BASOPHILS # BLD AUTO: 0.04 X10*3/UL (ref 0–0.1)
BASOPHILS NFR BLD AUTO: 0.4 %
BILIRUB SERPL-MCNC: 0.6 MG/DL (ref 0–1.2)
BUN SERPL-MCNC: 18 MG/DL (ref 6–23)
CALCIUM SERPL-MCNC: 9.8 MG/DL (ref 8.6–10.6)
CHLORIDE SERPL-SCNC: 93 MMOL/L (ref 98–107)
CO2 SERPL-SCNC: 30 MMOL/L (ref 21–32)
CREAT SERPL-MCNC: 0.83 MG/DL (ref 0.5–1.05)
EGFRCR SERPLBLD CKD-EPI 2021: 82 ML/MIN/1.73M*2
EOSINOPHIL # BLD AUTO: 0.09 X10*3/UL (ref 0–0.7)
EOSINOPHIL NFR BLD AUTO: 0.9 %
ERYTHROCYTE [DISTWIDTH] IN BLOOD BY AUTOMATED COUNT: 12.2 % (ref 11.5–14.5)
FERRITIN SERPL-MCNC: 362 NG/ML (ref 8–150)
GGT SERPL-CCNC: 88 U/L (ref 5–55)
GLUCOSE SERPL-MCNC: 452 MG/DL (ref 74–99)
HCT VFR BLD AUTO: 43.3 % (ref 36–46)
HGB BLD-MCNC: 14.7 G/DL (ref 12–16)
IMM GRANULOCYTES # BLD AUTO: 0.04 X10*3/UL (ref 0–0.7)
IMM GRANULOCYTES NFR BLD AUTO: 0.4 % (ref 0–0.9)
LYMPHOCYTES # BLD AUTO: 2.05 X10*3/UL (ref 1.2–4.8)
LYMPHOCYTES NFR BLD AUTO: 20.7 %
MCH RBC QN AUTO: 31.5 PG (ref 26–34)
MCHC RBC AUTO-ENTMCNC: 33.9 G/DL (ref 32–36)
MCV RBC AUTO: 93 FL (ref 80–100)
MONOCYTES # BLD AUTO: 0.74 X10*3/UL (ref 0.1–1)
MONOCYTES NFR BLD AUTO: 7.5 %
NEUTROPHILS # BLD AUTO: 6.94 X10*3/UL (ref 1.2–7.7)
NEUTROPHILS NFR BLD AUTO: 70.1 %
NRBC BLD-RTO: 0 /100 WBCS (ref 0–0)
PLATELET # BLD AUTO: 264 X10*3/UL (ref 150–450)
POTASSIUM SERPL-SCNC: 5.2 MMOL/L (ref 3.5–5.3)
PROT SERPL-MCNC: 7.4 G/DL (ref 6.4–8.2)
RBC # BLD AUTO: 4.67 X10*6/UL (ref 4–5.2)
SODIUM SERPL-SCNC: 132 MMOL/L (ref 136–145)
WBC # BLD AUTO: 9.9 X10*3/UL (ref 4.4–11.3)

## 2025-01-28 ENCOUNTER — HOSPITAL ENCOUNTER (OUTPATIENT)
Dept: CARDIOLOGY | Facility: HOSPITAL | Age: 60
Discharge: HOME | End: 2025-01-28
Payer: COMMERCIAL

## 2025-01-28 ENCOUNTER — APPOINTMENT (OUTPATIENT)
Dept: CARDIOLOGY | Facility: HOSPITAL | Age: 60
End: 2025-01-28
Payer: COMMERCIAL

## 2025-01-28 VITALS
SYSTOLIC BLOOD PRESSURE: 140 MMHG | DIASTOLIC BLOOD PRESSURE: 80 MMHG | HEIGHT: 62 IN | HEART RATE: 114 BPM | WEIGHT: 234 LBS | BODY MASS INDEX: 43.06 KG/M2

## 2025-01-28 DIAGNOSIS — I25.10 CORONARY ARTERY DISEASE, UNSPECIFIED VESSEL OR LESION TYPE, UNSPECIFIED WHETHER ANGINA PRESENT, UNSPECIFIED WHETHER NATIVE OR TRANSPLANTED HEART: Primary | ICD-10-CM

## 2025-01-28 DIAGNOSIS — I10 HYPERTENSION, UNSPECIFIED TYPE: ICD-10-CM

## 2025-01-28 PROCEDURE — 3079F DIAST BP 80-89 MM HG: CPT | Performed by: INTERNAL MEDICINE

## 2025-01-28 PROCEDURE — 99214 OFFICE O/P EST MOD 30 MIN: CPT | Performed by: INTERNAL MEDICINE

## 2025-01-28 PROCEDURE — 93010 ELECTROCARDIOGRAM REPORT: CPT | Performed by: INTERNAL MEDICINE

## 2025-01-28 PROCEDURE — 93005 ELECTROCARDIOGRAM TRACING: CPT

## 2025-01-28 PROCEDURE — 3008F BODY MASS INDEX DOCD: CPT | Performed by: INTERNAL MEDICINE

## 2025-01-28 PROCEDURE — 99214 OFFICE O/P EST MOD 30 MIN: CPT | Mod: 25 | Performed by: INTERNAL MEDICINE

## 2025-01-28 PROCEDURE — 1036F TOBACCO NON-USER: CPT | Performed by: INTERNAL MEDICINE

## 2025-01-28 PROCEDURE — 3077F SYST BP >= 140 MM HG: CPT | Performed by: INTERNAL MEDICINE

## 2025-01-28 RX ORDER — CARVEDILOL 6.25 MG/1
6.25 TABLET ORAL 2 TIMES DAILY
Qty: 180 TABLET | Refills: 3 | Status: SHIPPED | OUTPATIENT
Start: 2025-01-28 | End: 2026-01-28

## 2025-01-28 RX ORDER — LISINOPRIL 5 MG/1
5 TABLET ORAL DAILY
Qty: 90 TABLET | Refills: 3 | Status: SHIPPED | OUTPATIENT
Start: 2025-01-28 | End: 2026-01-28

## 2025-01-28 NOTE — PROGRESS NOTES
Subjective:  Patient returns for a routine annual follow-up.  She is a 59-year-old diabetic with hypertension, hyperlipidemia, and known coronary disease.  She is status post NSTEMI and RCA stenting a little over 6 years ago.    She unfortunately had a bout of the flu in December of last year.  She was seen in the emergency room and apparently had some hypoglycemia as well as hyponatremia.  She is following up with her other doctors on this and will be seeing her endocrinologist in several weeks.    She denies any clear chest discomfort or dyspnea on her current activity level.  She does feel some fatigue on occasion and is concerned about whether this is a potential side effect from the metoprolol.  She remains compliant with her aspirin and rosuvastatin.  She denies any other new health concerns.    Objective:  General: Alert, usual pleasant self.  HEENT: Unchanged.  Lungs: Clear without crackles or wheezing.  Cardiac: Distant heart tones.  Abdomen: Nontender.  Extremities: No edema.  Skin: No acute rash.  Neuro: Grossly intact.    EKG: Sinus tachycardia with PACs.  Slow R wave progression.    Lipid panel: Cholesterol-154, HDL-56, LDL-73, TG-128.    Impression/plan:  Stephanie is generally doing reasonably well at this time without any concerning cardiac complaints.  I thus did not think we needed to embark on a repeat ischemic workup at this time.  She knows to continue on her aspirin therapy to guard against delayed stent restenosis.    Her blood pressure remains a bit less than ideally controlled.  She also is raising the concern about whether she is having some potential side effects from metoprolol.  I elected to increase her lisinopril to 5 mg daily and will also change her metoprolol to carvedilol 6.25 mg twice a day.  Hopefully this will bring her blood pressure under better control, and will hopefully bring her heart rate down a bit as well.    Her lipid panel looks excellent on low-dose rosuvastatin so we  will continue this unchanged.    I will see her back briefly in 6 weeks just to reassess her heart rate and blood pressure control.  She knows to call for any intercurrent concerns before then.    Patient instructions:    Change your lisinopril and carvedilol as directed.    Continue other medications unchanged.    Return to clinic in 6 weeks.

## 2025-01-29 LAB
ATRIAL RATE: 114 BPM
P AXIS: 68 DEGREES
P OFFSET: 199 MS
P ONSET: 141 MS
PR INTERVAL: 160 MS
Q ONSET: 221 MS
QRS COUNT: 19 BEATS
QRS DURATION: 80 MS
QT INTERVAL: 336 MS
QTC CALCULATION(BAZETT): 463 MS
QTC FREDERICIA: 416 MS
R AXIS: 62 DEGREES
T AXIS: 59 DEGREES
T OFFSET: 389 MS
VENTRICULAR RATE: 114 BPM

## 2025-01-29 PROCEDURE — 93005 ELECTROCARDIOGRAM TRACING: CPT

## 2025-02-18 LAB
EST. AVERAGE GLUCOSE BLD GHB EST-MCNC: 206 MG/DL
EST. AVERAGE GLUCOSE BLD GHB EST-SCNC: 11.4 MMOL/L
HBA1C MFR BLD: 8.8 % OF TOTAL HGB

## 2025-02-28 ENCOUNTER — APPOINTMENT (OUTPATIENT)
Dept: RADIOLOGY | Facility: CLINIC | Age: 60
End: 2025-02-28
Payer: COMMERCIAL

## 2025-03-06 ENCOUNTER — APPOINTMENT (OUTPATIENT)
Dept: ENDOCRINOLOGY | Facility: CLINIC | Age: 60
End: 2025-03-06
Payer: COMMERCIAL

## 2025-03-06 VITALS
BODY MASS INDEX: 43.95 KG/M2 | SYSTOLIC BLOOD PRESSURE: 152 MMHG | WEIGHT: 240.3 LBS | HEART RATE: 108 BPM | DIASTOLIC BLOOD PRESSURE: 84 MMHG

## 2025-03-06 DIAGNOSIS — E10.9 TYPE 1 DIABETES MELLITUS WITHOUT COMPLICATION: Primary | ICD-10-CM

## 2025-03-06 PROCEDURE — 3077F SYST BP >= 140 MM HG: CPT | Performed by: INTERNAL MEDICINE

## 2025-03-06 PROCEDURE — 1036F TOBACCO NON-USER: CPT | Performed by: INTERNAL MEDICINE

## 2025-03-06 PROCEDURE — 3079F DIAST BP 80-89 MM HG: CPT | Performed by: INTERNAL MEDICINE

## 2025-03-06 PROCEDURE — 99214 OFFICE O/P EST MOD 30 MIN: CPT | Performed by: INTERNAL MEDICINE

## 2025-03-06 PROCEDURE — 4010F ACE/ARB THERAPY RXD/TAKEN: CPT | Performed by: INTERNAL MEDICINE

## 2025-03-06 RX ORDER — METOPROLOL SUCCINATE 25 MG/1
25 TABLET, EXTENDED RELEASE ORAL DAILY
COMMUNITY

## 2025-03-06 RX ORDER — GLUCAGON 1 MG
1 VIAL (EA) INJECTION AS NEEDED
Qty: 1 EACH | Refills: 3 | Status: SHIPPED | OUTPATIENT
Start: 2025-03-06 | End: 2026-03-06

## 2025-03-06 ASSESSMENT — ENCOUNTER SYMPTOMS
ARTHRALGIAS: 0
NERVOUS/ANXIOUS: 0
SORE THROAT: 0
NAUSEA: 0
CONSTIPATION: 0
FEVER: 0
HEADACHES: 0
VOMITING: 0
FREQUENCY: 0
APPETITE CHANGE: 0
POLYDIPSIA: 0
ABDOMINAL PAIN: 0
UNEXPECTED WEIGHT CHANGE: 1
SHORTNESS OF BREATH: 0
DIARRHEA: 0
COUGH: 1

## 2025-03-06 NOTE — PATIENT INSTRUCTIONS
RECOMMENDATIONS  Humulin N   Breakfast 44 units  Bedtime 50 units     Humalog 30-40 units before meals.     Follow up 3 months  A1c before next appointment

## 2025-03-06 NOTE — PROGRESS NOTES
History Of Present Illness  Stephanie Pierre is a 59 y.o. female     Duration of type 1 diabetes mellitus:  50 years  Complications:  Cardiovascular disease    ED visit 24 for nausea and vomiting.      Humulin N   Breakfast 40 units  Bedtime 46-48 units     Humalog 30-40 units before meals.      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    Surgical History  She has a past surgical history that includes Eye surgery (2017); Other surgical history (2017);  section, classic (2017); Other surgical history (2021); MR angio head wo IV contrast (11/10/2019); MR angio neck wo IV contrast (11/10/2019); CT angio head w and wo IV contrast (2023); and CT angio neck (2023).     Social History  She reports that she has never smoked. She has never used smokeless tobacco. She reports that she does not drink alcohol and does not use drugs.    Family History  No family history on file.    Medications  Current Outpatient Medications   Medication Instructions    acetaminophen (Tylenol) 325 mg tablet Every 4 hours PRN    aspirin (ASPIR-81 ORAL) Aspir-81    HumaLOG U-100 Insulin 30-40 Units, subcutaneous, 3 times daily before meals, Take as directed per insulin instructions.    HumuLIN N NPH U-100 Insulin 40-48 Units, subcutaneous, 2 times daily, Take as directed per insulin instructions.    lisinopril 5 mg, oral, Daily    metoprolol succinate XL (TOPROL-XL) 25 mg, Daily    multivitamin tablet 1 tablet, Daily    rosuvastatin (CRESTOR) 10 mg, Daily    Travatan Z 0.004 % drops ophthalmic solution  Every 24 hours       Allergies  Benzoin, Cefaclor, Clarithromycin, Clindamycin, Pseudoephedrine, Sulfamethoxazole-trimethoprim, Brimonidine-timolol, Insulin glargine, Penicillins, Phenobarbital, and Phenytoin    Review of Systems   Constitutional:  Positive for unexpected weight change. Negative for appetite change and fever.   HENT:  Negative for sore throat.         Denies dry mouth   Eyes:  Negative for visual disturbance.   Respiratory:  Positive for cough. Negative for shortness of breath.    Cardiovascular:  Negative for chest pain.   Gastrointestinal:  Negative for abdominal pain, constipation, diarrhea, nausea and vomiting.   Endocrine: Negative for polydipsia and polyuria.   Genitourinary:  Negative for frequency.   Musculoskeletal:  Negative for arthralgias.   Skin:  Negative for rash.   Neurological:  Negative for headaches.   Psychiatric/Behavioral:  The patient is not nervous/anxious.          Last Recorded Vitals  Blood pressure 152/84, pulse 108, weight 109 kg (240 lb 4.8 oz).    Physical Exam  Constitutional:       General: She is not in acute distress.     Appearance: She is obese.   HENT:      Head: Normocephalic.   Eyes:      Extraocular Movements: Extraocular movements intact.   Neck:      Thyroid: No thyroid mass or thyromegaly.   Cardiovascular:      Pulses:           Radial pulses are 2+ on the right side and 2+ on the left side.   Musculoskeletal:      Right lower leg: No edema.      Left lower leg: No edema.   Lymphadenopathy:      Cervical: No cervical adenopathy.   Neurological:      Mental Status: She is alert.      Motor: No tremor.   Psychiatric:         Mood and Affect: Affect normal.          Relevant Results  Glucose (mg/dL)   Date Value   01/13/2025 452 (HH)   12/01/2024 125 (H)   06/18/2024 126 (H)     HEMOGLOBIN A1c (% of total Hgb)   Date Value   02/17/2025 8.8 (H)     Hemoglobin A1C (%)   Date Value   11/16/2024 8.4 (H)   07/27/2024 8.8 (H)   04/27/2024 8.9 (H)     Bicarbonate  (mmol/L)   Date Value   01/13/2025 30   12/01/2024 29   06/18/2024 29     Urea Nitrogen (mg/dL)   Date Value   01/13/2025 18   12/01/2024 20   06/18/2024 18     Creatinine (mg/dL)   Date Value   01/13/2025 0.83   12/01/2024 0.77   06/18/2024 0.75     Lab Results   Component Value Date    CHOL 154 04/27/2024    CHOL 146 01/14/2023    CHOL 149 05/15/2021     Lab Results   Component Value Date    HDL 55.6 04/27/2024    HDL 52.4 01/14/2023    HDL 56.7 05/15/2021     Lab Results   Component Value Date    LDLCALC 73 04/27/2024     Lab Results   Component Value Date    TRIG 128 04/27/2024    TRIG 115 01/14/2023    TRIG 149 05/15/2021       IMPRESSION  TYPE 1 DIABETES MELLITUS  No improvement in A1c  Allergy to Lantus  No coverage for CGM  She is not interested in insulin pump therapy      RECOMMENDATIONS  Humulin N   Breakfast 44 units  Bedtime 50 units     Humalog 30-40 units before meals.     Follow up 3 months  A1c before next appointment

## 2025-03-06 NOTE — LETTER
2025     Victor Hugo Dominguez MD  [address]  [address]  [place] [address]    Patient: Stephanie Pierre   YOB: 1965   Date of Visit: 3/6/2025       Dear Dr. Victor Hugo Dominguez MD:    Thank you for referring Stephanie Pierre to me for evaluation. Below are my notes for this consultation.  If you have questions, please do not hesitate to call me. I look forward to following your patient along with you.       Sincerely,     Hunter Rodriguez MD      CC: No Recipients  ______________________________________________________________________________________    History Of Present Illness  Stephanie Pierre is a 59 y.o. female     Duration of type 1 diabetes mellitus:  50 years  Complications:  Cardiovascular disease    ED visit 24 for nausea and vomiting.      Humulin N   Breakfast 40 units  Bedtime 46-48 units     Humalog 30-40 units before meals.      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    Surgical History  She has a past surgical history that includes Eye surgery (2017); Other surgical history (2017);  section, classic (2017); Other surgical history (2021); MR angio head wo IV contrast (11/10/2019); MR angio neck wo IV contrast (11/10/2019); CT angio head w and wo IV contrast (2023); and CT angio neck (2023).     Social History  She reports that she has never smoked. She has never used smokeless tobacco. She reports that she does not drink alcohol and does not use drugs.    Family History  No family history on file.    Medications  Current Outpatient  Medications   Medication Instructions   • acetaminophen (Tylenol) 325 mg tablet Every 4 hours PRN   • aspirin (ASPIR-81 ORAL) Aspir-81   • HumaLOG U-100 Insulin 30-40 Units, subcutaneous, 3 times daily before meals, Take as directed per insulin instructions.   • HumuLIN N NPH U-100 Insulin 40-48 Units, subcutaneous, 2 times daily, Take as directed per insulin instructions.   • lisinopril 5 mg, oral, Daily   • metoprolol succinate XL (TOPROL-XL) 25 mg, Daily   • multivitamin tablet 1 tablet, Daily   • rosuvastatin (CRESTOR) 10 mg, Daily   • Travatan Z 0.004 % drops ophthalmic solution Every 24 hours       Allergies  Benzoin, Cefaclor, Clarithromycin, Clindamycin, Pseudoephedrine, Sulfamethoxazole-trimethoprim, Brimonidine-timolol, Insulin glargine, Penicillins, Phenobarbital, and Phenytoin    Review of Systems   Constitutional:  Positive for unexpected weight change. Negative for appetite change and fever.   HENT:  Negative for sore throat.         Denies dry mouth   Eyes:  Negative for visual disturbance.   Respiratory:  Positive for cough. Negative for shortness of breath.    Cardiovascular:  Negative for chest pain.   Gastrointestinal:  Negative for abdominal pain, constipation, diarrhea, nausea and vomiting.   Endocrine: Negative for polydipsia and polyuria.   Genitourinary:  Negative for frequency.   Musculoskeletal:  Negative for arthralgias.   Skin:  Negative for rash.   Neurological:  Negative for headaches.   Psychiatric/Behavioral:  The patient is not nervous/anxious.          Last Recorded Vitals  Blood pressure 152/84, pulse 108, weight 109 kg (240 lb 4.8 oz).    Physical Exam  Constitutional:       General: She is not in acute distress.     Appearance: She is obese.   HENT:      Head: Normocephalic.   Eyes:      Extraocular Movements: Extraocular movements intact.   Neck:      Thyroid: No thyroid mass or thyromegaly.   Cardiovascular:      Pulses:           Radial pulses are 2+ on the right side and 2+  on the left side.   Musculoskeletal:      Right lower leg: No edema.      Left lower leg: No edema.   Lymphadenopathy:      Cervical: No cervical adenopathy.   Neurological:      Mental Status: She is alert.      Motor: No tremor.   Psychiatric:         Mood and Affect: Affect normal.          Relevant Results  Glucose (mg/dL)   Date Value   01/13/2025 452 (HH)   12/01/2024 125 (H)   06/18/2024 126 (H)     HEMOGLOBIN A1c (% of total Hgb)   Date Value   02/17/2025 8.8 (H)     Hemoglobin A1C (%)   Date Value   11/16/2024 8.4 (H)   07/27/2024 8.8 (H)   04/27/2024 8.9 (H)     Bicarbonate (mmol/L)   Date Value   01/13/2025 30   12/01/2024 29   06/18/2024 29     Urea Nitrogen (mg/dL)   Date Value   01/13/2025 18   12/01/2024 20   06/18/2024 18     Creatinine (mg/dL)   Date Value   01/13/2025 0.83   12/01/2024 0.77   06/18/2024 0.75     Lab Results   Component Value Date    CHOL 154 04/27/2024    CHOL 146 01/14/2023    CHOL 149 05/15/2021     Lab Results   Component Value Date    HDL 55.6 04/27/2024    HDL 52.4 01/14/2023    HDL 56.7 05/15/2021     Lab Results   Component Value Date    LDLCALC 73 04/27/2024     Lab Results   Component Value Date    TRIG 128 04/27/2024    TRIG 115 01/14/2023    TRIG 149 05/15/2021       IMPRESSION  TYPE 1 DIABETES MELLITUS  No improvement in A1c  Allergy to Lantus  No coverage for CGM  She is not interested in insulin pump therapy      RECOMMENDATIONS  Humulin N   Breakfast 44 units  Bedtime 50 units     Humalog 30-40 units before meals.     Follow up 3 months  A1c before next appointment

## 2025-03-11 ENCOUNTER — OFFICE VISIT (OUTPATIENT)
Dept: CARDIOLOGY | Facility: HOSPITAL | Age: 60
End: 2025-03-11
Payer: COMMERCIAL

## 2025-03-11 VITALS
HEART RATE: 101 BPM | HEIGHT: 62 IN | DIASTOLIC BLOOD PRESSURE: 68 MMHG | BODY MASS INDEX: 43.52 KG/M2 | RESPIRATION RATE: 18 BRPM | OXYGEN SATURATION: 96 % | WEIGHT: 236.5 LBS | SYSTOLIC BLOOD PRESSURE: 146 MMHG

## 2025-03-11 DIAGNOSIS — R00.0 TACHYCARDIA: ICD-10-CM

## 2025-03-11 DIAGNOSIS — I25.10 CORONARY ARTERY DISEASE DUE TO LIPID RICH PLAQUE: Primary | ICD-10-CM

## 2025-03-11 DIAGNOSIS — I10 ESSENTIAL (PRIMARY) HYPERTENSION: ICD-10-CM

## 2025-03-11 DIAGNOSIS — I25.83 CORONARY ARTERY DISEASE DUE TO LIPID RICH PLAQUE: Primary | ICD-10-CM

## 2025-03-11 DIAGNOSIS — E78.2 MIXED HYPERLIPIDEMIA: ICD-10-CM

## 2025-03-11 PROBLEM — E78.5 HYPERLIPIDEMIA: Status: ACTIVE | Noted: 2017-08-23

## 2025-03-11 PROBLEM — E10.9 DIABETES MELLITUS TYPE 1 (MULTI): Status: ACTIVE | Noted: 2025-03-11

## 2025-03-11 PROCEDURE — 3008F BODY MASS INDEX DOCD: CPT | Performed by: NURSE PRACTITIONER

## 2025-03-11 PROCEDURE — 99214 OFFICE O/P EST MOD 30 MIN: CPT | Performed by: NURSE PRACTITIONER

## 2025-03-11 PROCEDURE — 1036F TOBACCO NON-USER: CPT | Performed by: NURSE PRACTITIONER

## 2025-03-11 PROCEDURE — 4010F ACE/ARB THERAPY RXD/TAKEN: CPT | Performed by: NURSE PRACTITIONER

## 2025-03-11 PROCEDURE — 3078F DIAST BP <80 MM HG: CPT | Performed by: NURSE PRACTITIONER

## 2025-03-11 PROCEDURE — 3077F SYST BP >= 140 MM HG: CPT | Performed by: NURSE PRACTITIONER

## 2025-03-11 NOTE — PATIENT INSTRUCTIONS
Stop metoprolol     Come back in one week to have a 72 hour monitor placed     Follow up in one month     Call for any concerns

## 2025-03-11 NOTE — PROGRESS NOTES
Subjective   Stephanie Pierre is a 59 y.o. female.    Chief Complaint:  Hypertension, Hyperlipidemia, and Coronary Artery Disease    Mrs. Pierre returns for a routine 6 week follow up. She is seen in collaboration with Dr. Aragon. At her last visit, we increased lisinopril to 5 mg daily and changed metoprolol to carvedilol 6.25 mg twice a day. She apparently was not able to tolerate carvedilol due to shortness of breath, so she went back to taking metoprolol 25 mg (1/2 tablet BID). Her biggest issue with the metoprolol is lack of energy. She offers no other cardiovascular complaints or concerns today. She denies any complaints of chest pain, shortness of breath, lightheadedness, dizziness, palpitations, syncope, orthopnea, paroxysmal nocturnal dyspnea, lower extremity swelling or bleeding concerns.          Review of Systems   All other systems reviewed and are negative.      Objective   Physical Exam  Constitutional:       Appearance: Healthy appearance. In no distress  Pulmonary:      Effort: Pulmonary effort is normal.      Breath sounds: Normal breath sounds.   Cardiovascular:      Normal rate. Tachycardic rhythm. Normal S1. Normal S2.       Murmurs: There is no murmur.      Carotids: right carotid pulse +2, no bruit heard over the right carotid. left carotid pulse +2, no bruit heard over the left carotid.  Edema:     Peripheral edema absent.   Abdominal:      Palpations: Abdomen is soft.   Musculoskeletal:       Cervical back: Normal range of motion.   Skin:     General: Skin is warm and dry. Normal color and pigmentation   Neurological:      Mental Status: Alert and oriented to person, place and time.   Psychiatric:     Mood and Affect: appropriate mood and appropriate affect.     Lab Review:   Lab Results   Component Value Date     (L) 01/13/2025    K 5.2 01/13/2025    CL 93 (L) 01/13/2025    CO2 30 01/13/2025    BUN 18 01/13/2025    CREATININE 0.83 01/13/2025    GLUCOSE 452 (HH) 01/13/2025     CALCIUM 9.8 01/13/2025     Lab Results   Component Value Date    WBC 9.9 01/13/2025    HGB 14.7 01/13/2025    HCT 43.3 01/13/2025    MCV 93 01/13/2025     01/13/2025     Lab Results   Component Value Date    CHOL 154 04/27/2024    TRIG 128 04/27/2024    HDL 55.6 04/27/2024    LDLDIRECT 82 04/27/2024       Assessment/Plan   Mrs. Pierre is a 59 year old  female with a past medical history significant for hyperlipidemia, Type I Diabetes and CAD/NSTEMI s/p PCI to RCA 9/2018. Echocardiogram 9/2018 showed a hyperdynamic LV with an EF of 70% and no significant valvular disease. She presents today for routine follow up stable from a cardiac standpoint. She unfortunately was not able to tolerate carvedilol due to shortness of breath, and she continues to complain of lack of energy on metoprolol. I will have her stop metoprolol. She will come in to have a 72 hour monitor placed next week, and follow up with us in clinic in one month. At that time we will review her monitor results, and determine if we can keep her off metoprolol. She knows to call for any concerns.

## 2025-03-26 ENCOUNTER — HOSPITAL ENCOUNTER (OUTPATIENT)
Dept: CARDIOLOGY | Facility: HOSPITAL | Age: 60
Discharge: HOME | End: 2025-03-26
Payer: COMMERCIAL

## 2025-03-26 DIAGNOSIS — R00.0 TACHYCARDIA: ICD-10-CM

## 2025-03-26 PROCEDURE — 93225 XTRNL ECG REC<48 HRS REC: CPT

## 2025-04-23 ENCOUNTER — APPOINTMENT (OUTPATIENT)
Dept: CARDIOLOGY | Facility: CLINIC | Age: 60
End: 2025-04-23
Payer: COMMERCIAL

## 2025-04-28 PROBLEM — E11.319 DIABETIC RETINOPATHY (MULTI): Status: ACTIVE | Noted: 2025-04-28

## 2025-04-28 PROBLEM — H53.002 AMBLYOPIA OF LEFT EYE: Status: ACTIVE | Noted: 2025-04-28

## 2025-04-28 PROBLEM — D68.51 FACTOR 5 LEIDEN MUTATION, HETEROZYGOUS (MULTI): Status: ACTIVE | Noted: 2025-04-28

## 2025-04-28 PROBLEM — H26.9 CATARACT, LEFT: Status: ACTIVE | Noted: 2025-04-28

## 2025-04-30 ENCOUNTER — APPOINTMENT (OUTPATIENT)
Dept: CARDIOLOGY | Facility: CLINIC | Age: 60
End: 2025-04-30
Payer: COMMERCIAL

## 2025-04-30 VITALS
HEIGHT: 62 IN | DIASTOLIC BLOOD PRESSURE: 82 MMHG | OXYGEN SATURATION: 95 % | BODY MASS INDEX: 43.24 KG/M2 | SYSTOLIC BLOOD PRESSURE: 148 MMHG | HEART RATE: 98 BPM | WEIGHT: 235 LBS

## 2025-04-30 DIAGNOSIS — I25.10 CORONARY ARTERY DISEASE DUE TO LIPID RICH PLAQUE: Primary | ICD-10-CM

## 2025-04-30 DIAGNOSIS — I25.83 CORONARY ARTERY DISEASE DUE TO LIPID RICH PLAQUE: Primary | ICD-10-CM

## 2025-04-30 DIAGNOSIS — I10 ESSENTIAL (PRIMARY) HYPERTENSION: ICD-10-CM

## 2025-04-30 PROCEDURE — 1036F TOBACCO NON-USER: CPT | Performed by: NURSE PRACTITIONER

## 2025-04-30 PROCEDURE — 3079F DIAST BP 80-89 MM HG: CPT | Performed by: NURSE PRACTITIONER

## 2025-04-30 PROCEDURE — 99214 OFFICE O/P EST MOD 30 MIN: CPT | Performed by: NURSE PRACTITIONER

## 2025-04-30 PROCEDURE — 4010F ACE/ARB THERAPY RXD/TAKEN: CPT | Performed by: NURSE PRACTITIONER

## 2025-04-30 PROCEDURE — 3077F SYST BP >= 140 MM HG: CPT | Performed by: NURSE PRACTITIONER

## 2025-04-30 PROCEDURE — 3008F BODY MASS INDEX DOCD: CPT | Performed by: NURSE PRACTITIONER

## 2025-04-30 ASSESSMENT — PAIN SCALES - GENERAL: PAINLEVEL_OUTOF10: 0-NO PAIN

## 2025-04-30 NOTE — PROGRESS NOTES
Subjective   Stephanie Pierre is a 59 y.o. female.    Chief Complaint:  Hypertension and Coronary Artery Disease    Mrs. Pierre returns for a routine one month follow up. She was complaining of fatigue on metoprolol, and was unable to tolerate carvedilol. 3-day Holter monitor off metoprolol showed sinus rhythm with an average HR of 97 bpm with <1% VE and SVE. She reports feeling significantly better off metoprolol. She offers no other cardiovascular complaints or concerns today. She denies any complaints of chest pain, shortness of breath, lightheadedness, dizziness, palpitations, syncope, orthopnea, paroxysmal nocturnal dyspnea, lower extremity swelling or bleeding concerns.         Review of Systems   All other systems reviewed and are negative.      Objective   Physical Exam  Constitutional:       Appearance: Healthy appearance. In no distress  Pulmonary:      Effort: Pulmonary effort is normal.      Breath sounds: Normal breath sounds.   Cardiovascular:      Normal rate. Regular rhythm. Normal S1. Normal S2.       Murmurs: There is no murmur.      Carotids: right carotid pulse +2, no bruit heard over the right carotid. left carotid pulse +2, no bruit heard over the left carotid.  Edema:     Peripheral edema absent.   Abdominal:      Palpations: Abdomen is soft.   Musculoskeletal:       Cervical back: Normal range of motion.   Skin:     General: Skin is warm and dry. Normal color and pigmentation   Neurological:      Mental Status: Alert and oriented to person, place and time.   Psychiatric:     Mood and Affect: appropriate mood and appropriate affect.       Lab Review:   Lab Results   Component Value Date     (L) 01/13/2025    K 5.2 01/13/2025    CL 93 (L) 01/13/2025    CO2 30 01/13/2025    BUN 18 01/13/2025    CREATININE 0.83 01/13/2025    GLUCOSE 452 (HH) 01/13/2025    CALCIUM 9.8 01/13/2025     Lab Results   Component Value Date    WBC 9.9 01/13/2025    HGB 14.7 01/13/2025    HCT 43.3 01/13/2025    MCV  93 01/13/2025     01/13/2025     Lab Results   Component Value Date    CHOL 154 04/27/2024    TRIG 128 04/27/2024    HDL 55.6 04/27/2024    LDLDIRECT 82 04/27/2024       Assessment/Plan   Mrs. Pierre is a 59 year old  female with a past medical history significant for hyperlipidemia, Type I Diabetes and CAD/NSTEMI s/p PCI to RCA 9/2018. Echocardiogram 9/2018 showed a hyperdynamic LV with an EF of 70% and no significant valvular disease. She presents today for routine follow up stable from a cardiac standpoint. Holter monitor 4/2025 (off metoprolol) showed sinus rhythm with an average HR of 97 bpm with <1% VE and SVE. She reports feeling significantly better off metoprolol. She may remain off metoprolol at this time. She was encouraged to work on increasing her activity. She will follow up with us in clinic in 4 months. She knows to call for any concerns.

## 2025-06-06 DIAGNOSIS — E10.9 TYPE 1 DIABETES MELLITUS WITHOUT COMPLICATION: ICD-10-CM

## 2025-06-15 LAB
EST. AVERAGE GLUCOSE BLD GHB EST-MCNC: 214 MG/DL
EST. AVERAGE GLUCOSE BLD GHB EST-SCNC: 11.9 MMOL/L
HBA1C MFR BLD: 9.1 %

## 2025-06-19 ENCOUNTER — APPOINTMENT (OUTPATIENT)
Dept: ENDOCRINOLOGY | Facility: CLINIC | Age: 60
End: 2025-06-19
Payer: COMMERCIAL

## 2025-07-30 ENCOUNTER — APPOINTMENT (OUTPATIENT)
Dept: RADIOLOGY | Facility: HOSPITAL | Age: 60
End: 2025-07-30
Payer: COMMERCIAL

## 2025-07-30 ENCOUNTER — HOSPITAL ENCOUNTER (OUTPATIENT)
Facility: HOSPITAL | Age: 60
Setting detail: OBSERVATION
Discharge: HOME | End: 2025-07-30
Attending: INTERNAL MEDICINE | Admitting: INTERNAL MEDICINE
Payer: COMMERCIAL

## 2025-07-30 ENCOUNTER — HOSPITAL ENCOUNTER (OUTPATIENT)
Dept: CARDIOLOGY | Facility: HOSPITAL | Age: 60
Discharge: HOME | End: 2025-07-30
Payer: COMMERCIAL

## 2025-07-30 ENCOUNTER — ANESTHESIA EVENT (OUTPATIENT)
Dept: OPERATING ROOM | Facility: HOSPITAL | Age: 60
End: 2025-07-30
Payer: COMMERCIAL

## 2025-07-30 ENCOUNTER — ANESTHESIA (OUTPATIENT)
Dept: OPERATING ROOM | Facility: HOSPITAL | Age: 60
End: 2025-07-30
Payer: COMMERCIAL

## 2025-07-30 VITALS
OXYGEN SATURATION: 96 % | RESPIRATION RATE: 18 BRPM | HEIGHT: 62 IN | WEIGHT: 240.3 LBS | SYSTOLIC BLOOD PRESSURE: 130 MMHG | HEART RATE: 63 BPM | BODY MASS INDEX: 44.22 KG/M2 | TEMPERATURE: 98.8 F | DIASTOLIC BLOOD PRESSURE: 60 MMHG

## 2025-07-30 DIAGNOSIS — K36 OTHER APPENDICITIS: Primary | ICD-10-CM

## 2025-07-30 DIAGNOSIS — E10.9 TYPE 1 DIABETES MELLITUS WITHOUT COMPLICATION: ICD-10-CM

## 2025-07-30 PROBLEM — Z95.5 STENTED CORONARY ARTERY: Status: ACTIVE | Noted: 2025-07-30

## 2025-07-30 LAB
ALBUMIN SERPL BCP-MCNC: 4.1 G/DL (ref 3.4–5)
ALBUMIN SERPL BCP-MCNC: 4.2 G/DL (ref 3.4–5)
ALP SERPL-CCNC: 91 U/L (ref 33–110)
ALP SERPL-CCNC: 93 U/L (ref 33–110)
ALT SERPL W P-5'-P-CCNC: 23 U/L (ref 7–45)
ALT SERPL W P-5'-P-CCNC: 24 U/L (ref 7–45)
ANION GAP BLDV CALCULATED.4IONS-SCNC: 8 MMOL/L (ref 10–25)
ANION GAP SERPL CALC-SCNC: 13 MMOL/L (ref 10–20)
ANION GAP SERPL CALC-SCNC: 15 MMOL/L (ref 10–20)
APPEARANCE UR: CLEAR
AST SERPL W P-5'-P-CCNC: 15 U/L (ref 9–39)
AST SERPL W P-5'-P-CCNC: 16 U/L (ref 9–39)
B-HCG SERPL-ACNC: 3 MIU/ML
BACTERIA #/AREA URNS AUTO: ABNORMAL /HPF
BASE EXCESS BLDV CALC-SCNC: 5.4 MMOL/L (ref -2–3)
BASOPHILS # BLD AUTO: 0.05 X10*3/UL (ref 0–0.1)
BASOPHILS # BLD AUTO: 0.06 X10*3/UL (ref 0–0.1)
BASOPHILS NFR BLD AUTO: 0.2 %
BASOPHILS NFR BLD AUTO: 0.3 %
BILIRUB DIRECT SERPL-MCNC: 0.1 MG/DL (ref 0–0.3)
BILIRUB SERPL-MCNC: 0.5 MG/DL (ref 0–1.2)
BILIRUB SERPL-MCNC: 0.7 MG/DL (ref 0–1.2)
BILIRUB UR STRIP.AUTO-MCNC: NEGATIVE MG/DL
BODY TEMPERATURE: 37 DEGREES CELSIUS
BUN SERPL-MCNC: 19 MG/DL (ref 6–23)
BUN SERPL-MCNC: 22 MG/DL (ref 6–23)
CA-I BLDV-SCNC: 1.24 MMOL/L (ref 1.1–1.33)
CALCIUM SERPL-MCNC: 9.3 MG/DL (ref 8.6–10.3)
CALCIUM SERPL-MCNC: 9.9 MG/DL (ref 8.6–10.3)
CHLORIDE BLDV-SCNC: 95 MMOL/L (ref 98–107)
CHLORIDE SERPL-SCNC: 95 MMOL/L (ref 98–107)
CHLORIDE SERPL-SCNC: 96 MMOL/L (ref 98–107)
CO2 SERPL-SCNC: 26 MMOL/L (ref 21–32)
CO2 SERPL-SCNC: 30 MMOL/L (ref 21–32)
COLOR UR: ABNORMAL
CREAT SERPL-MCNC: 0.7 MG/DL (ref 0.5–1.05)
CREAT SERPL-MCNC: 0.77 MG/DL (ref 0.5–1.05)
EGFRCR SERPLBLD CKD-EPI 2021: 90 ML/MIN/1.73M*2
EGFRCR SERPLBLD CKD-EPI 2021: >90 ML/MIN/1.73M*2
EOSINOPHIL # BLD AUTO: 0.01 X10*3/UL (ref 0–0.7)
EOSINOPHIL # BLD AUTO: 0.01 X10*3/UL (ref 0–0.7)
EOSINOPHIL NFR BLD AUTO: 0 %
EOSINOPHIL NFR BLD AUTO: 0.1 %
ERYTHROCYTE [DISTWIDTH] IN BLOOD BY AUTOMATED COUNT: 11.8 % (ref 11.5–14.5)
ERYTHROCYTE [DISTWIDTH] IN BLOOD BY AUTOMATED COUNT: 11.9 % (ref 11.5–14.5)
GLUCOSE BLD MANUAL STRIP-MCNC: 297 MG/DL (ref 74–99)
GLUCOSE BLD MANUAL STRIP-MCNC: 348 MG/DL (ref 74–99)
GLUCOSE BLD MANUAL STRIP-MCNC: 354 MG/DL (ref 74–99)
GLUCOSE BLD MANUAL STRIP-MCNC: 355 MG/DL (ref 74–99)
GLUCOSE BLD MANUAL STRIP-MCNC: 363 MG/DL (ref 74–99)
GLUCOSE BLDV-MCNC: 286 MG/DL (ref 74–99)
GLUCOSE SERPL-MCNC: 282 MG/DL (ref 74–99)
GLUCOSE SERPL-MCNC: 377 MG/DL (ref 74–99)
GLUCOSE UR STRIP.AUTO-MCNC: ABNORMAL MG/DL
HCO3 BLDV-SCNC: 32.4 MMOL/L (ref 22–26)
HCT VFR BLD AUTO: 41.8 % (ref 36–46)
HCT VFR BLD AUTO: 42.4 % (ref 36–46)
HCT VFR BLD EST: 43 % (ref 36–46)
HGB BLD-MCNC: 14.1 G/DL (ref 12–16)
HGB BLD-MCNC: 14.1 G/DL (ref 12–16)
HGB BLDV-MCNC: 14.3 G/DL (ref 12–16)
IMM GRANULOCYTES # BLD AUTO: 0.12 X10*3/UL (ref 0–0.7)
IMM GRANULOCYTES # BLD AUTO: 0.13 X10*3/UL (ref 0–0.7)
IMM GRANULOCYTES NFR BLD AUTO: 0.6 % (ref 0–0.9)
IMM GRANULOCYTES NFR BLD AUTO: 0.6 % (ref 0–0.9)
INHALED O2 CONCENTRATION: 21 %
KETONES UR STRIP.AUTO-MCNC: ABNORMAL MG/DL
LACTATE BLDV-SCNC: 1.7 MMOL/L (ref 0.4–2)
LEUKOCYTE ESTERASE UR QL STRIP.AUTO: NEGATIVE
LIPASE SERPL-CCNC: 33 U/L (ref 9–82)
LYMPHOCYTES # BLD AUTO: 0.96 X10*3/UL (ref 1.2–4.8)
LYMPHOCYTES # BLD AUTO: 1.57 X10*3/UL (ref 1.2–4.8)
LYMPHOCYTES NFR BLD AUTO: 4.4 %
LYMPHOCYTES NFR BLD AUTO: 8.2 %
MAGNESIUM SERPL-MCNC: 1.7 MG/DL (ref 1.6–2.4)
MAGNESIUM SERPL-MCNC: 1.84 MG/DL (ref 1.6–2.4)
MCH RBC QN AUTO: 31.3 PG (ref 26–34)
MCH RBC QN AUTO: 32.1 PG (ref 26–34)
MCHC RBC AUTO-ENTMCNC: 33.3 G/DL (ref 32–36)
MCHC RBC AUTO-ENTMCNC: 33.7 G/DL (ref 32–36)
MCV RBC AUTO: 94 FL (ref 80–100)
MCV RBC AUTO: 95 FL (ref 80–100)
MONOCYTES # BLD AUTO: 0.78 X10*3/UL (ref 0.1–1)
MONOCYTES # BLD AUTO: 1.13 X10*3/UL (ref 0.1–1)
MONOCYTES NFR BLD AUTO: 4 %
MONOCYTES NFR BLD AUTO: 5.2 %
MUCOUS THREADS #/AREA URNS AUTO: ABNORMAL /LPF
NEUTROPHILS # BLD AUTO: 16.72 X10*3/UL (ref 1.2–7.7)
NEUTROPHILS # BLD AUTO: 19.48 X10*3/UL (ref 1.2–7.7)
NEUTROPHILS NFR BLD AUTO: 86.8 %
NEUTROPHILS NFR BLD AUTO: 89.6 %
NITRITE UR QL STRIP.AUTO: NEGATIVE
NRBC BLD-RTO: 0 /100 WBCS (ref 0–0)
NRBC BLD-RTO: 0 /100 WBCS (ref 0–0)
OXYHGB MFR BLDV: 46.9 % (ref 45–75)
PCO2 BLDV: 56 MM HG (ref 41–51)
PH BLDV: 7.37 PH (ref 7.33–7.43)
PH UR STRIP.AUTO: 5.5 [PH]
PLATELET # BLD AUTO: 244 X10*3/UL (ref 150–450)
PLATELET # BLD AUTO: 267 X10*3/UL (ref 150–450)
PO2 BLDV: 29 MM HG (ref 35–45)
POTASSIUM BLDV-SCNC: 4.8 MMOL/L (ref 3.5–5.3)
POTASSIUM SERPL-SCNC: 4.9 MMOL/L (ref 3.5–5.3)
POTASSIUM SERPL-SCNC: 5 MMOL/L (ref 3.5–5.3)
PROT SERPL-MCNC: 7.2 G/DL (ref 6.4–8.2)
PROT SERPL-MCNC: 7.3 G/DL (ref 6.4–8.2)
PROT UR STRIP.AUTO-MCNC: ABNORMAL MG/DL
RBC # BLD AUTO: 4.39 X10*6/UL (ref 4–5.2)
RBC # BLD AUTO: 4.5 X10*6/UL (ref 4–5.2)
RBC # UR STRIP.AUTO: ABNORMAL MG/DL
RBC #/AREA URNS AUTO: ABNORMAL /HPF
SAO2 % BLDV: 48 % (ref 45–75)
SODIUM BLDV-SCNC: 131 MMOL/L (ref 136–145)
SODIUM SERPL-SCNC: 132 MMOL/L (ref 136–145)
SODIUM SERPL-SCNC: 133 MMOL/L (ref 136–145)
SP GR UR STRIP.AUTO: 1.02
SQUAMOUS #/AREA URNS AUTO: ABNORMAL /HPF
UROBILINOGEN UR STRIP.AUTO-MCNC: NORMAL MG/DL
WBC # BLD AUTO: 19.3 X10*3/UL (ref 4.4–11.3)
WBC # BLD AUTO: 21.8 X10*3/UL (ref 4.4–11.3)
WBC #/AREA URNS AUTO: ABNORMAL /HPF

## 2025-07-30 PROCEDURE — G0378 HOSPITAL OBSERVATION PER HR: HCPCS

## 2025-07-30 PROCEDURE — 96365 THER/PROPH/DIAG IV INF INIT: CPT | Mod: 59

## 2025-07-30 PROCEDURE — 3700000001 HC GENERAL ANESTHESIA TIME - INITIAL BASE CHARGE: Performed by: STUDENT IN AN ORGANIZED HEALTH CARE EDUCATION/TRAINING PROGRAM

## 2025-07-30 PROCEDURE — 2500000002 HC RX 250 W HCPCS SELF ADMINISTERED DRUGS (ALT 637 FOR MEDICARE OP, ALT 636 FOR OP/ED)

## 2025-07-30 PROCEDURE — 83735 ASSAY OF MAGNESIUM: CPT | Performed by: SURGERY

## 2025-07-30 PROCEDURE — 96375 TX/PRO/DX INJ NEW DRUG ADDON: CPT | Mod: 59

## 2025-07-30 PROCEDURE — 83690 ASSAY OF LIPASE: CPT | Performed by: SURGERY

## 2025-07-30 PROCEDURE — 2500000001 HC RX 250 WO HCPCS SELF ADMINISTERED DRUGS (ALT 637 FOR MEDICARE OP): Performed by: ANESTHESIOLOGIST ASSISTANT

## 2025-07-30 PROCEDURE — 84132 ASSAY OF SERUM POTASSIUM: CPT | Mod: 59 | Performed by: HOSPITALIST

## 2025-07-30 PROCEDURE — 99222 1ST HOSP IP/OBS MODERATE 55: CPT | Performed by: INTERNAL MEDICINE

## 2025-07-30 PROCEDURE — 74177 CT ABD & PELVIS W/CONTRAST: CPT | Performed by: RADIOLOGY

## 2025-07-30 PROCEDURE — 99285 EMERGENCY DEPT VISIT HI MDM: CPT | Mod: 25

## 2025-07-30 PROCEDURE — 2500000002 HC RX 250 W HCPCS SELF ADMINISTERED DRUGS (ALT 637 FOR MEDICARE OP, ALT 636 FOR OP/ED): Performed by: INTERNAL MEDICINE

## 2025-07-30 PROCEDURE — 99222 1ST HOSP IP/OBS MODERATE 55: CPT

## 2025-07-30 PROCEDURE — 85025 COMPLETE CBC W/AUTO DIFF WBC: CPT | Performed by: SURGERY

## 2025-07-30 PROCEDURE — 81003 URINALYSIS AUTO W/O SCOPE: CPT | Performed by: SURGERY

## 2025-07-30 PROCEDURE — 82248 BILIRUBIN DIRECT: CPT | Performed by: SURGERY

## 2025-07-30 PROCEDURE — 99234 HOSP IP/OBS SM DT SF/LOW 45: CPT

## 2025-07-30 PROCEDURE — 88304 TISSUE EXAM BY PATHOLOGIST: CPT | Performed by: PATHOLOGY

## 2025-07-30 PROCEDURE — 2500000001 HC RX 250 WO HCPCS SELF ADMINISTERED DRUGS (ALT 637 FOR MEDICARE OP)

## 2025-07-30 PROCEDURE — 82947 ASSAY GLUCOSE BLOOD QUANT: CPT

## 2025-07-30 PROCEDURE — 96367 TX/PROPH/DG ADDL SEQ IV INF: CPT | Mod: 59

## 2025-07-30 PROCEDURE — 0752T DGTZ GLS MCRSCP SLD LVL III: CPT | Mod: TC,AHULAB | Performed by: STUDENT IN AN ORGANIZED HEALTH CARE EDUCATION/TRAINING PROGRAM

## 2025-07-30 PROCEDURE — 84702 CHORIONIC GONADOTROPIN TEST: CPT | Performed by: SURGERY

## 2025-07-30 PROCEDURE — 96376 TX/PRO/DX INJ SAME DRUG ADON: CPT | Mod: 59

## 2025-07-30 PROCEDURE — 96361 HYDRATE IV INFUSION ADD-ON: CPT | Mod: 59

## 2025-07-30 PROCEDURE — 2500000004 HC RX 250 GENERAL PHARMACY W/ HCPCS (ALT 636 FOR OP/ED): Mod: JZ | Performed by: ANESTHESIOLOGIST ASSISTANT

## 2025-07-30 PROCEDURE — 3600000004 HC OR TIME - INITIAL BASE CHARGE - PROCEDURE LEVEL FOUR: Performed by: STUDENT IN AN ORGANIZED HEALTH CARE EDUCATION/TRAINING PROGRAM

## 2025-07-30 PROCEDURE — 3600000009 HC OR TIME - EACH INCREMENTAL 1 MINUTE - PROCEDURE LEVEL FOUR: Performed by: STUDENT IN AN ORGANIZED HEALTH CARE EDUCATION/TRAINING PROGRAM

## 2025-07-30 PROCEDURE — 36415 COLL VENOUS BLD VENIPUNCTURE: CPT | Performed by: SURGERY

## 2025-07-30 PROCEDURE — 85025 COMPLETE CBC W/AUTO DIFF WBC: CPT | Performed by: HOSPITALIST

## 2025-07-30 PROCEDURE — 2500000004 HC RX 250 GENERAL PHARMACY W/ HCPCS (ALT 636 FOR OP/ED): Performed by: HOSPITALIST

## 2025-07-30 PROCEDURE — 2550000001 HC RX 255 CONTRASTS: Performed by: SURGERY

## 2025-07-30 PROCEDURE — 2500000001 HC RX 250 WO HCPCS SELF ADMINISTERED DRUGS (ALT 637 FOR MEDICARE OP): Performed by: HOSPITALIST

## 2025-07-30 PROCEDURE — 2500000002 HC RX 250 W HCPCS SELF ADMINISTERED DRUGS (ALT 637 FOR MEDICARE OP, ALT 636 FOR OP/ED): Performed by: HOSPITALIST

## 2025-07-30 PROCEDURE — 74177 CT ABD & PELVIS W/CONTRAST: CPT

## 2025-07-30 PROCEDURE — 7100000002 HC RECOVERY ROOM TIME - EACH INCREMENTAL 1 MINUTE: Performed by: STUDENT IN AN ORGANIZED HEALTH CARE EDUCATION/TRAINING PROGRAM

## 2025-07-30 PROCEDURE — 84132 ASSAY OF SERUM POTASSIUM: CPT | Performed by: SURGERY

## 2025-07-30 PROCEDURE — 83735 ASSAY OF MAGNESIUM: CPT | Performed by: HOSPITALIST

## 2025-07-30 PROCEDURE — 2500000004 HC RX 250 GENERAL PHARMACY W/ HCPCS (ALT 636 FOR OP/ED)

## 2025-07-30 PROCEDURE — 36415 COLL VENOUS BLD VENIPUNCTURE: CPT | Performed by: HOSPITALIST

## 2025-07-30 PROCEDURE — 3700000002 HC GENERAL ANESTHESIA TIME - EACH INCREMENTAL 1 MINUTE: Performed by: STUDENT IN AN ORGANIZED HEALTH CARE EDUCATION/TRAINING PROGRAM

## 2025-07-30 PROCEDURE — 2500000004 HC RX 250 GENERAL PHARMACY W/ HCPCS (ALT 636 FOR OP/ED): Performed by: SURGERY

## 2025-07-30 PROCEDURE — 7100000001 HC RECOVERY ROOM TIME - INITIAL BASE CHARGE: Performed by: STUDENT IN AN ORGANIZED HEALTH CARE EDUCATION/TRAINING PROGRAM

## 2025-07-30 PROCEDURE — 44970 LAPAROSCOPY APPENDECTOMY: CPT | Performed by: STUDENT IN AN ORGANIZED HEALTH CARE EDUCATION/TRAINING PROGRAM

## 2025-07-30 PROCEDURE — 2720000007 HC OR 272 NO HCPCS: Performed by: STUDENT IN AN ORGANIZED HEALTH CARE EDUCATION/TRAINING PROGRAM

## 2025-07-30 PROCEDURE — 93005 ELECTROCARDIOGRAM TRACING: CPT

## 2025-07-30 PROCEDURE — 2500000004 HC RX 250 GENERAL PHARMACY W/ HCPCS (ALT 636 FOR OP/ED): Performed by: STUDENT IN AN ORGANIZED HEALTH CARE EDUCATION/TRAINING PROGRAM

## 2025-07-30 RX ORDER — ONDANSETRON HYDROCHLORIDE 2 MG/ML
4 INJECTION, SOLUTION INTRAVENOUS ONCE AS NEEDED
Status: DISCONTINUED | OUTPATIENT
Start: 2025-07-30 | End: 2025-07-30 | Stop reason: HOSPADM

## 2025-07-30 RX ORDER — MORPHINE SULFATE 4 MG/ML
4 INJECTION, SOLUTION INTRAMUSCULAR; INTRAVENOUS ONCE
Status: COMPLETED | OUTPATIENT
Start: 2025-07-30 | End: 2025-07-30

## 2025-07-30 RX ORDER — LATANOPROST 50 UG/ML
1 SOLUTION/ DROPS OPHTHALMIC NIGHTLY
Status: DISCONTINUED | OUTPATIENT
Start: 2025-07-30 | End: 2025-07-30 | Stop reason: HOSPADM

## 2025-07-30 RX ORDER — POLYETHYLENE GLYCOL 3350 17 G/17G
17 POWDER, FOR SOLUTION ORAL DAILY PRN
Status: DISCONTINUED | OUTPATIENT
Start: 2025-07-30 | End: 2025-07-30 | Stop reason: HOSPADM

## 2025-07-30 RX ORDER — MIDAZOLAM HYDROCHLORIDE 2 MG/2ML
INJECTION, SOLUTION INTRAMUSCULAR; INTRAVENOUS AS NEEDED
Status: DISCONTINUED | OUTPATIENT
Start: 2025-07-30 | End: 2025-07-30

## 2025-07-30 RX ORDER — METRONIDAZOLE 500 MG/100ML
500 INJECTION, SOLUTION INTRAVENOUS ONCE
Status: COMPLETED | OUTPATIENT
Start: 2025-07-30 | End: 2025-07-30

## 2025-07-30 RX ORDER — INSULIN LISPRO 100 [IU]/ML
0-10 INJECTION, SOLUTION INTRAVENOUS; SUBCUTANEOUS
Status: DISCONTINUED | OUTPATIENT
Start: 2025-07-30 | End: 2025-07-30 | Stop reason: HOSPADM

## 2025-07-30 RX ORDER — FENTANYL CITRATE 50 UG/ML
12.5 INJECTION, SOLUTION INTRAMUSCULAR; INTRAVENOUS EVERY 5 MIN PRN
Status: DISCONTINUED | OUTPATIENT
Start: 2025-07-30 | End: 2025-07-30 | Stop reason: HOSPADM

## 2025-07-30 RX ORDER — LIDOCAINE HYDROCHLORIDE 20 MG/ML
INJECTION, SOLUTION INFILTRATION; PERINEURAL AS NEEDED
Status: DISCONTINUED | OUTPATIENT
Start: 2025-07-30 | End: 2025-07-30

## 2025-07-30 RX ORDER — ONDANSETRON 4 MG/1
4 TABLET, ORALLY DISINTEGRATING ORAL EVERY 8 HOURS PRN
Status: DISCONTINUED | OUTPATIENT
Start: 2025-07-30 | End: 2025-07-30 | Stop reason: HOSPADM

## 2025-07-30 RX ORDER — ASPIRIN 81 MG/1
81 TABLET ORAL DAILY
Status: DISCONTINUED | OUTPATIENT
Start: 2025-07-30 | End: 2025-07-30 | Stop reason: HOSPADM

## 2025-07-30 RX ORDER — METRONIDAZOLE 500 MG/100ML
500 INJECTION, SOLUTION INTRAVENOUS EVERY 8 HOURS
Status: DISCONTINUED | OUTPATIENT
Start: 2025-07-30 | End: 2025-07-30 | Stop reason: HOSPADM

## 2025-07-30 RX ORDER — SODIUM CHLORIDE, SODIUM LACTATE, POTASSIUM CHLORIDE, CALCIUM CHLORIDE 600; 310; 30; 20 MG/100ML; MG/100ML; MG/100ML; MG/100ML
100 INJECTION, SOLUTION INTRAVENOUS CONTINUOUS
Status: DISCONTINUED | OUTPATIENT
Start: 2025-07-30 | End: 2025-07-30 | Stop reason: HOSPADM

## 2025-07-30 RX ORDER — INSULIN LISPRO 100 [IU]/ML
0-10 INJECTION, SOLUTION INTRAVENOUS; SUBCUTANEOUS EVERY 4 HOURS
Status: DISCONTINUED | OUTPATIENT
Start: 2025-07-30 | End: 2025-07-30

## 2025-07-30 RX ORDER — DEXTROSE 50 % IN WATER (D50W) INTRAVENOUS SYRINGE
12.5
Status: DISCONTINUED | OUTPATIENT
Start: 2025-07-30 | End: 2025-07-30 | Stop reason: HOSPADM

## 2025-07-30 RX ORDER — ENOXAPARIN SODIUM 100 MG/ML
40 INJECTION SUBCUTANEOUS EVERY 12 HOURS SCHEDULED
Status: DISCONTINUED | OUTPATIENT
Start: 2025-07-31 | End: 2025-07-30 | Stop reason: HOSPADM

## 2025-07-30 RX ORDER — LABETALOL HYDROCHLORIDE 5 MG/ML
5 INJECTION, SOLUTION INTRAVENOUS ONCE AS NEEDED
Status: DISCONTINUED | OUTPATIENT
Start: 2025-07-30 | End: 2025-07-30 | Stop reason: HOSPADM

## 2025-07-30 RX ORDER — DEXTROSE 50 % IN WATER (D50W) INTRAVENOUS SYRINGE
25
Status: DISCONTINUED | OUTPATIENT
Start: 2025-07-30 | End: 2025-07-30 | Stop reason: HOSPADM

## 2025-07-30 RX ORDER — SENNOSIDES 8.6 MG/1
2 TABLET ORAL 2 TIMES DAILY
Status: DISCONTINUED | OUTPATIENT
Start: 2025-07-30 | End: 2025-07-30 | Stop reason: HOSPADM

## 2025-07-30 RX ORDER — ROSUVASTATIN CALCIUM 10 MG/1
10 TABLET, COATED ORAL DAILY
Status: DISCONTINUED | OUTPATIENT
Start: 2025-07-30 | End: 2025-07-30 | Stop reason: HOSPADM

## 2025-07-30 RX ORDER — OXYCODONE AND ACETAMINOPHEN 5; 325 MG/1; MG/1
1 TABLET ORAL EVERY 4 HOURS PRN
Status: DISCONTINUED | OUTPATIENT
Start: 2025-07-30 | End: 2025-07-30 | Stop reason: HOSPADM

## 2025-07-30 RX ORDER — CIPROFLOXACIN 2 MG/ML
400 INJECTION, SOLUTION INTRAVENOUS EVERY 12 HOURS
Status: DISCONTINUED | OUTPATIENT
Start: 2025-07-30 | End: 2025-07-30 | Stop reason: HOSPADM

## 2025-07-30 RX ORDER — PROPOFOL 10 MG/ML
INJECTION, EMULSION INTRAVENOUS AS NEEDED
Status: DISCONTINUED | OUTPATIENT
Start: 2025-07-30 | End: 2025-07-30

## 2025-07-30 RX ORDER — CEFAZOLIN 1 G/1
INJECTION, POWDER, FOR SOLUTION INTRAVENOUS AS NEEDED
Status: DISCONTINUED | OUTPATIENT
Start: 2025-07-30 | End: 2025-07-30

## 2025-07-30 RX ORDER — BUPIVACAINE HYDROCHLORIDE 5 MG/ML
INJECTION, SOLUTION PERINEURAL AS NEEDED
Status: DISCONTINUED | OUTPATIENT
Start: 2025-07-30 | End: 2025-07-30 | Stop reason: HOSPADM

## 2025-07-30 RX ORDER — ONDANSETRON HYDROCHLORIDE 2 MG/ML
4 INJECTION, SOLUTION INTRAVENOUS ONCE
Status: COMPLETED | OUTPATIENT
Start: 2025-07-30 | End: 2025-07-30

## 2025-07-30 RX ORDER — DROPERIDOL 2.5 MG/ML
0.62 INJECTION, SOLUTION INTRAMUSCULAR; INTRAVENOUS ONCE AS NEEDED
Status: DISCONTINUED | OUTPATIENT
Start: 2025-07-30 | End: 2025-07-30 | Stop reason: HOSPADM

## 2025-07-30 RX ORDER — ROCURONIUM BROMIDE 10 MG/ML
INJECTION, SOLUTION INTRAVENOUS AS NEEDED
Status: DISCONTINUED | OUTPATIENT
Start: 2025-07-30 | End: 2025-07-30

## 2025-07-30 RX ORDER — ALBUTEROL SULFATE 90 UG/1
INHALANT RESPIRATORY (INHALATION) AS NEEDED
Status: DISCONTINUED | OUTPATIENT
Start: 2025-07-30 | End: 2025-07-30

## 2025-07-30 RX ORDER — FENTANYL CITRATE 50 UG/ML
INJECTION, SOLUTION INTRAMUSCULAR; INTRAVENOUS AS NEEDED
Status: DISCONTINUED | OUTPATIENT
Start: 2025-07-30 | End: 2025-07-30

## 2025-07-30 RX ORDER — METRONIDAZOLE 500 MG/1
500 TABLET ORAL 3 TIMES DAILY
Qty: 15 TABLET | Refills: 0 | Status: SHIPPED | OUTPATIENT
Start: 2025-07-30 | End: 2025-08-04

## 2025-07-30 RX ORDER — ACETAMINOPHEN 325 MG/1
650 TABLET ORAL EVERY 4 HOURS PRN
Status: DISCONTINUED | OUTPATIENT
Start: 2025-07-30 | End: 2025-07-30 | Stop reason: HOSPADM

## 2025-07-30 RX ORDER — ONDANSETRON HYDROCHLORIDE 2 MG/ML
4 INJECTION, SOLUTION INTRAVENOUS EVERY 8 HOURS PRN
Status: DISCONTINUED | OUTPATIENT
Start: 2025-07-30 | End: 2025-07-30 | Stop reason: HOSPADM

## 2025-07-30 RX ORDER — KETOROLAC TROMETHAMINE 30 MG/ML
INJECTION, SOLUTION INTRAMUSCULAR; INTRAVENOUS AS NEEDED
Status: DISCONTINUED | OUTPATIENT
Start: 2025-07-30 | End: 2025-07-30

## 2025-07-30 RX ORDER — CEFEPIME HYDROCHLORIDE 2 G/50ML
2 INJECTION, SOLUTION INTRAVENOUS EVERY 8 HOURS
Status: DISCONTINUED | OUTPATIENT
Start: 2025-07-30 | End: 2025-07-30

## 2025-07-30 RX ORDER — CIPROFLOXACIN 2 MG/ML
400 INJECTION, SOLUTION INTRAVENOUS ONCE
Status: DISCONTINUED | OUTPATIENT
Start: 2025-07-30 | End: 2025-07-30

## 2025-07-30 RX ORDER — INSULIN LISPRO 100 [IU]/ML
10 INJECTION, SOLUTION INTRAVENOUS; SUBCUTANEOUS ONCE
OUTPATIENT
Start: 2025-07-30 | End: 2025-07-30

## 2025-07-30 RX ORDER — LIDOCAINE HYDROCHLORIDE 10 MG/ML
0.1 INJECTION, SOLUTION EPIDURAL; INFILTRATION; INTRACAUDAL; PERINEURAL ONCE
Status: DISCONTINUED | OUTPATIENT
Start: 2025-07-30 | End: 2025-07-30 | Stop reason: HOSPADM

## 2025-07-30 RX ORDER — ALBUTEROL SULFATE 0.83 MG/ML
2.5 SOLUTION RESPIRATORY (INHALATION) ONCE AS NEEDED
Status: DISCONTINUED | OUTPATIENT
Start: 2025-07-30 | End: 2025-07-30 | Stop reason: HOSPADM

## 2025-07-30 RX ORDER — INSULIN LISPRO 100 [IU]/ML
0-10 INJECTION, SOLUTION INTRAVENOUS; SUBCUTANEOUS
Status: DISCONTINUED | OUTPATIENT
Start: 2025-07-30 | End: 2025-07-30

## 2025-07-30 RX ORDER — SODIUM CHLORIDE, SODIUM LACTATE, POTASSIUM CHLORIDE, CALCIUM CHLORIDE 600; 310; 30; 20 MG/100ML; MG/100ML; MG/100ML; MG/100ML
50 INJECTION, SOLUTION INTRAVENOUS CONTINUOUS
Status: ACTIVE | OUTPATIENT
Start: 2025-07-30 | End: 2025-07-30

## 2025-07-30 RX ORDER — LISINOPRIL 5 MG/1
5 TABLET ORAL DAILY
Status: DISCONTINUED | OUTPATIENT
Start: 2025-07-30 | End: 2025-07-30 | Stop reason: HOSPADM

## 2025-07-30 RX ORDER — ENOXAPARIN SODIUM 100 MG/ML
40 INJECTION SUBCUTANEOUS EVERY 12 HOURS SCHEDULED
Status: DISCONTINUED | OUTPATIENT
Start: 2025-07-31 | End: 2025-07-30

## 2025-07-30 RX ORDER — CIPROFLOXACIN 500 MG/1
500 TABLET, FILM COATED ORAL 2 TIMES DAILY
Qty: 10 TABLET | Refills: 0 | Status: SHIPPED | OUTPATIENT
Start: 2025-07-30 | End: 2025-08-04

## 2025-07-30 RX ADMIN — PROPOFOL 20 MG: 10 INJECTION, EMULSION INTRAVENOUS at 12:34

## 2025-07-30 RX ADMIN — ROSUVASTATIN 10 MG: 10 TABLET, FILM COATED ORAL at 09:45

## 2025-07-30 RX ADMIN — CEFAZOLIN 2 G: 330 INJECTION, POWDER, FOR SOLUTION INTRAMUSCULAR; INTRAVENOUS at 12:05

## 2025-07-30 RX ADMIN — ROCURONIUM BROMIDE 50 MG: 10 INJECTION, SOLUTION INTRAVENOUS at 11:58

## 2025-07-30 RX ADMIN — DEXAMETHASONE SODIUM PHOSPHATE 4 MG: 4 INJECTION, SOLUTION INTRAMUSCULAR; INTRAVENOUS at 12:06

## 2025-07-30 RX ADMIN — MORPHINE SULFATE 4 MG: 4 INJECTION, SOLUTION INTRAMUSCULAR; INTRAVENOUS at 05:52

## 2025-07-30 RX ADMIN — MIDAZOLAM HYDROCHLORIDE 2 MG: 1 INJECTION, SOLUTION INTRAMUSCULAR; INTRAVENOUS at 11:53

## 2025-07-30 RX ADMIN — IOHEXOL 75 ML: 350 INJECTION, SOLUTION INTRAVENOUS at 04:00

## 2025-07-30 RX ADMIN — SODIUM CHLORIDE, POTASSIUM CHLORIDE, SODIUM LACTATE AND CALCIUM CHLORIDE: 600; 310; 30; 20 INJECTION, SOLUTION INTRAVENOUS at 11:53

## 2025-07-30 RX ADMIN — INSULIN LISPRO 6 UNITS: 100 INJECTION, SOLUTION INTRAVENOUS; SUBCUTANEOUS at 17:56

## 2025-07-30 RX ADMIN — ALBUTEROL SULFATE 10 PUFF: 90 AEROSOL, METERED RESPIRATORY (INHALATION) at 12:17

## 2025-07-30 RX ADMIN — ASPIRIN 81 MG: 81 TABLET, DELAYED RELEASE ORAL at 09:45

## 2025-07-30 RX ADMIN — Medication: at 12:58

## 2025-07-30 RX ADMIN — PROPOFOL 180 MG: 10 INJECTION, EMULSION INTRAVENOUS at 11:58

## 2025-07-30 RX ADMIN — ONDANSETRON 4 MG: 2 INJECTION, SOLUTION INTRAMUSCULAR; INTRAVENOUS at 12:38

## 2025-07-30 RX ADMIN — ONDANSETRON 4 MG: 2 INJECTION, SOLUTION INTRAMUSCULAR; INTRAVENOUS at 03:41

## 2025-07-30 RX ADMIN — LIDOCAINE HYDROCHLORIDE 60 MG: 20 INJECTION, SOLUTION INFILTRATION; PERINEURAL at 11:58

## 2025-07-30 RX ADMIN — LISINOPRIL 5 MG: 5 TABLET ORAL at 09:45

## 2025-07-30 RX ADMIN — METRONIDAZOLE 500 MG: 500 INJECTION, SOLUTION INTRAVENOUS at 17:45

## 2025-07-30 RX ADMIN — SUGAMMADEX 200 MG: 100 INJECTION, SOLUTION INTRAVENOUS at 12:46

## 2025-07-30 RX ADMIN — FENTANYL CITRATE 50 MCG: 50 INJECTION, SOLUTION INTRAMUSCULAR; INTRAVENOUS at 11:58

## 2025-07-30 RX ADMIN — SODIUM CHLORIDE 500 ML: 0.9 INJECTION, SOLUTION INTRAVENOUS at 03:40

## 2025-07-30 RX ADMIN — METRONIDAZOLE 500 MG: 500 INJECTION, SOLUTION INTRAVENOUS at 05:54

## 2025-07-30 RX ADMIN — FENTANYL CITRATE 50 MCG: 50 INJECTION, SOLUTION INTRAMUSCULAR; INTRAVENOUS at 12:27

## 2025-07-30 RX ADMIN — CIPROFLOXACIN 400 MG: 2 INJECTION, SOLUTION INTRAVENOUS at 07:20

## 2025-07-30 RX ADMIN — INSULIN LISPRO 10 UNITS: 100 INJECTION, SOLUTION INTRAVENOUS; SUBCUTANEOUS at 14:50

## 2025-07-30 RX ADMIN — INSULIN HUMAN 30 UNITS: 100 INJECTION, SUSPENSION SUBCUTANEOUS at 09:46

## 2025-07-30 RX ADMIN — SENNOSIDES 17.2 MG: 8.6 TABLET, FILM COATED ORAL at 09:45

## 2025-07-30 RX ADMIN — SODIUM CHLORIDE, SODIUM LACTATE, POTASSIUM CHLORIDE, AND CALCIUM CHLORIDE 100 ML/HR: .6; .31; .03; .02 INJECTION, SOLUTION INTRAVENOUS at 06:57

## 2025-07-30 RX ADMIN — KETOROLAC TROMETHAMINE 30 MG: 30 INJECTION, SOLUTION INTRAMUSCULAR at 12:38

## 2025-07-30 SDOH — SOCIAL STABILITY: SOCIAL INSECURITY
WITHIN THE LAST YEAR, HAVE YOU BEEN RAPED OR FORCED TO HAVE ANY KIND OF SEXUAL ACTIVITY BY YOUR PARTNER OR EX-PARTNER?: NO

## 2025-07-30 SDOH — ECONOMIC STABILITY: INCOME INSECURITY: IN THE PAST 12 MONTHS HAS THE ELECTRIC, GAS, OIL, OR WATER COMPANY THREATENED TO SHUT OFF SERVICES IN YOUR HOME?: NO

## 2025-07-30 SDOH — SOCIAL STABILITY: SOCIAL INSECURITY: WITHIN THE LAST YEAR, HAVE YOU BEEN AFRAID OF YOUR PARTNER OR EX-PARTNER?: NO

## 2025-07-30 SDOH — SOCIAL STABILITY: SOCIAL INSECURITY
WITHIN THE LAST YEAR, HAVE YOU BEEN KICKED, HIT, SLAPPED, OR OTHERWISE PHYSICALLY HURT BY YOUR PARTNER OR EX-PARTNER?: NO

## 2025-07-30 SDOH — SOCIAL STABILITY: SOCIAL INSECURITY: WERE YOU ABLE TO COMPLETE ALL THE BEHAVIORAL HEALTH SCREENINGS?: YES

## 2025-07-30 SDOH — SOCIAL STABILITY: SOCIAL INSECURITY: HAVE YOU HAD ANY THOUGHTS OF HARMING ANYONE ELSE?: NO

## 2025-07-30 SDOH — SOCIAL STABILITY: SOCIAL INSECURITY: HAVE YOU HAD THOUGHTS OF HARMING ANYONE ELSE?: NO

## 2025-07-30 SDOH — SOCIAL STABILITY: SOCIAL INSECURITY: ARE YOU OR HAVE YOU BEEN THREATENED OR ABUSED PHYSICALLY, EMOTIONALLY, OR SEXUALLY BY ANYONE?: NO

## 2025-07-30 SDOH — SOCIAL STABILITY: SOCIAL INSECURITY: WITHIN THE LAST YEAR, HAVE YOU BEEN HUMILIATED OR EMOTIONALLY ABUSED IN OTHER WAYS BY YOUR PARTNER OR EX-PARTNER?: NO

## 2025-07-30 SDOH — SOCIAL STABILITY: SOCIAL INSECURITY: DO YOU FEEL ANYONE HAS EXPLOITED OR TAKEN ADVANTAGE OF YOU FINANCIALLY OR OF YOUR PERSONAL PROPERTY?: NO

## 2025-07-30 SDOH — ECONOMIC STABILITY: FOOD INSECURITY: WITHIN THE PAST 12 MONTHS, THE FOOD YOU BOUGHT JUST DIDN'T LAST AND YOU DIDN'T HAVE MONEY TO GET MORE.: NEVER TRUE

## 2025-07-30 SDOH — ECONOMIC STABILITY: FOOD INSECURITY: WITHIN THE PAST 12 MONTHS, YOU WORRIED THAT YOUR FOOD WOULD RUN OUT BEFORE YOU GOT THE MONEY TO BUY MORE.: NEVER TRUE

## 2025-07-30 SDOH — SOCIAL STABILITY: SOCIAL INSECURITY: ARE THERE ANY APPARENT SIGNS OF INJURIES/BEHAVIORS THAT COULD BE RELATED TO ABUSE/NEGLECT?: NO

## 2025-07-30 SDOH — SOCIAL STABILITY: SOCIAL INSECURITY: HAS ANYONE EVER THREATENED TO HURT YOUR FAMILY OR YOUR PETS?: NO

## 2025-07-30 SDOH — SOCIAL STABILITY: SOCIAL INSECURITY: ABUSE: ADULT

## 2025-07-30 SDOH — SOCIAL STABILITY: SOCIAL INSECURITY: DO YOU FEEL UNSAFE GOING BACK TO THE PLACE WHERE YOU ARE LIVING?: NO

## 2025-07-30 SDOH — SOCIAL STABILITY: SOCIAL INSECURITY: DOES ANYONE TRY TO KEEP YOU FROM HAVING/CONTACTING OTHER FRIENDS OR DOING THINGS OUTSIDE YOUR HOME?: NO

## 2025-07-30 ASSESSMENT — ACTIVITIES OF DAILY LIVING (ADL)
GROOMING: INDEPENDENT
ASSISTIVE_DEVICE: EYEGLASSES
HEARING - LEFT EAR: FUNCTIONAL
DRESSING YOURSELF: INDEPENDENT
TOILETING: INDEPENDENT
JUDGMENT_ADEQUATE_SAFELY_COMPLETE_DAILY_ACTIVITIES: YES
ADEQUATE_TO_COMPLETE_ADL: YES
PATIENT'S MEMORY ADEQUATE TO SAFELY COMPLETE DAILY ACTIVITIES?: YES
HEARING - RIGHT EAR: FUNCTIONAL
BATHING: INDEPENDENT
WALKS IN HOME: INDEPENDENT
FEEDING YOURSELF: INDEPENDENT
LACK_OF_TRANSPORTATION: NO

## 2025-07-30 ASSESSMENT — COGNITIVE AND FUNCTIONAL STATUS - GENERAL
DAILY ACTIVITIY SCORE: 24
DAILY ACTIVITIY SCORE: 24
PATIENT BASELINE BEDBOUND: NO
MOBILITY SCORE: 24
MOBILITY SCORE: 24

## 2025-07-30 ASSESSMENT — COLUMBIA-SUICIDE SEVERITY RATING SCALE - C-SSRS
6. HAVE YOU EVER DONE ANYTHING, STARTED TO DO ANYTHING, OR PREPARED TO DO ANYTHING TO END YOUR LIFE?: NO
1. IN THE PAST MONTH, HAVE YOU WISHED YOU WERE DEAD OR WISHED YOU COULD GO TO SLEEP AND NOT WAKE UP?: NO
2. HAVE YOU ACTUALLY HAD ANY THOUGHTS OF KILLING YOURSELF?: NO

## 2025-07-30 ASSESSMENT — ENCOUNTER SYMPTOMS
VOMITING: 0
ENDOCRINE COMMENTS: AS ABOVE
APPETITE CHANGE: 1
NAUSEA: 1
ABDOMINAL PAIN: 1
DIARRHEA: 0

## 2025-07-30 ASSESSMENT — PATIENT HEALTH QUESTIONNAIRE - PHQ9
SUM OF ALL RESPONSES TO PHQ9 QUESTIONS 1 & 2: 0
2. FEELING DOWN, DEPRESSED OR HOPELESS: NOT AT ALL
1. LITTLE INTEREST OR PLEASURE IN DOING THINGS: NOT AT ALL

## 2025-07-30 ASSESSMENT — PAIN - FUNCTIONAL ASSESSMENT
PAIN_FUNCTIONAL_ASSESSMENT: 0-10
PAIN_FUNCTIONAL_ASSESSMENT: VAS (VISUAL ANALOG SCALE)
PAIN_FUNCTIONAL_ASSESSMENT: 0-10
PAIN_FUNCTIONAL_ASSESSMENT: VAS (VISUAL ANALOG SCALE)
PAIN_FUNCTIONAL_ASSESSMENT: 0-10

## 2025-07-30 ASSESSMENT — PAIN DESCRIPTION - LOCATION
LOCATION: ABDOMEN
LOCATION: ABDOMEN

## 2025-07-30 ASSESSMENT — PAIN SCALES - GENERAL
PAINLEVEL_OUTOF10: 8
PAINLEVEL_OUTOF10: 4
PAINLEVEL_OUTOF10: 0 - NO PAIN
PAINLEVEL_OUTOF10: 0 - NO PAIN
PAINLEVEL_OUTOF10: 8
PAINLEVEL_OUTOF10: 0 - NO PAIN
PAINLEVEL_OUTOF10: 0 - NO PAIN
PAINLEVEL_OUTOF10: 4
PAINLEVEL_OUTOF10: 0 - NO PAIN

## 2025-07-30 ASSESSMENT — LIFESTYLE VARIABLES
HOW MANY STANDARD DRINKS CONTAINING ALCOHOL DO YOU HAVE ON A TYPICAL DAY: PATIENT DOES NOT DRINK
SKIP TO QUESTIONS 9-10: 1
HOW OFTEN DO YOU HAVE 6 OR MORE DRINKS ON ONE OCCASION: NEVER
AUDIT-C TOTAL SCORE: 0
AUDIT-C TOTAL SCORE: 0
HOW OFTEN DO YOU HAVE A DRINK CONTAINING ALCOHOL: NEVER

## 2025-07-30 NOTE — PROGRESS NOTES
Pharmacy Medication History     Source of Information: Per patient     Additional concerns with the patient's PTA list.   N/a  Notified Provider via Haiku : No no changes     The following updates were made to the Prior to Admission medication list:     Medications ADDED:   N/a  Medications CHANGED:  N/a  Medications REMOVED:   N/a  Medications NOT TAKING:   N/a    Allergy reviewed : Yes    Meds 2 Beds : Yes    Outpatient pharmacy confirmed and updated in chart : Yes    Pharmacy name: [facility]     The list below reflectives the updated PTA list. Please review each medication in order reconciliation for additional clarification and justification.    Prior to Admission Medications   Prescriptions Last Dose   HumaLOG U-100 Insulin 100 unit/mL injection 7/29/2025 Evening   Sig: Inject 30-40 Units under the skin 3 times a day before meals. Take as directed per insulin instructions.   HumuLIN N NPH U-100 Insulin 100 unit/mL injection 7/29/2025 Bedtime   Sig: Inject 40-48 Units under the skin 2 times a day. Take as directed per insulin instructions.   Travatan Z 0.004 % drops ophthalmic solution 7/29/2025 Bedtime   Sig: Administer 1 drop into both eyes once daily at bedtime.   acetaminophen (Tylenol) 325 mg tablet Past Month   Sig: Take by mouth every 4 hours if needed.   aspirin (ASPIR-81 ORAL) 7/29/2025 Morning   Sig: Take 1 tablet by mouth once daily.   glucagon (Glucagon Emergency Kit, human,) 1 mg injection    Sig: Inject 1 mg under the skin if needed for low blood sugar - see comments (Severe hypoglycemia).   lisinopril 5 mg tablet 7/29/2025 Morning   Sig: Take 1 tablet (5 mg) by mouth once daily.   multivitamin tablet 7/29/2025 Morning   Sig: Take 1 tablet by mouth once daily.   rosuvastatin (Crestor) 10 mg tablet 7/29/2025 Morning   Sig: Take 1 tablet (10 mg) by mouth once daily.      Facility-Administered Medications: None       The list below reflectives the updated allergy list. Please review each  documented allergy for additional clarification and justification.    Allergies   Allergen Reactions    Benzoin Hives    Cefaclor Hives    Clarithromycin GI Upset and Hives    Clindamycin GI Upset and Hives    Pseudoephedrine Hives and Nausea/vomiting    Sulfamethoxazole-Trimethoprim GI Upset and Hives    Brimonidine-Timolol Rash    Insulin Glargine Rash    Penicillins GI Upset, Hives and Rash    Phenobarbital GI Upset, Hives and Rash    Phenytoin GI Upset, Hives, Other, Unknown, Rash and Nausea/vomiting          07/30/25 at 11:17 AM - Lety Fonseca

## 2025-07-30 NOTE — DISCHARGE INSTRUCTIONS
Instructions After Appendix Surgery    Wound Care:  -Ice packs to wounds every hour the first day  -Ok to shower in 24 hours  -no baths or swimming for 2 weeks, do not soak incisions   -keep wound clean and dry  -do not apply topical creams or ointments  -call if you notice redness around wound, foul-smelliing drainage, or increasing pain   - you have a glue called dermabond over your incisions, these provide a water seal and allow you to shower. Do not scrub at incisions, do not pick at glue. Glue showed slowly flake off over 1-2 weeks    Diet:   -[place], soft meals first day or two after surgery    Activity   -Take it easy for the first 48 hours   -stairs and walks are fine   -resume activities gradually over the first week   -ask Dr Davis before resuming strenuous physical exertions   -No driving while on pain medication    Medications   -Pain medicine prescription attached for severe pain   -You can also take Motrin/Ibuprofen 400mg every 6 hours for mild pain   -Resume your home medications unless otherwise directed   -To avoid constipation please take Colace 100mg twice a day (over the counter)    Other Instructions   -Call to make appointment within 1-2 days:  [phone]   -Call the doctor for the following:  severe unrelieved pain  fevers > 101F  Nausea/vomiting  wound issues  insurance/return to work forms  shortness of breath  chest pains   -Feedback on your surgical experience is appreciated!

## 2025-07-30 NOTE — ANESTHESIA PROCEDURE NOTES
Airway  Date/Time: 7/30/2025 12:02 PM  Reason: elective    Airway not difficult    Staffing  Performed: JOSE A   Authorized by: Jerald Cason MD    Performed by: JOSE A Zamora  Patient location during procedure: OR    Patient Condition  Indications for airway management: anesthesia and airway protection  Sedation level: deep     Final Airway Details   Preoxygenated: yes  Final airway type: endotracheal airway  Successful airway: ETT  Cuffed: yes   Successful intubation technique: direct laryngoscopy  Adjuncts used in placement: intubating stylet and cricoid pressure  Blade: Rosie  Blade size: #3  ETT size (mm): 7.0  Cormack-Lehane Classification: grade III - view of epiglottis only  Placement verified by: capnometry   Measured from: lips  ETT to lips (cm): 20  Number of attempts at approach: 1    Additional Comments  Recommend videoscope in the future

## 2025-07-30 NOTE — ANESTHESIA PREPROCEDURE EVALUATION
Patient: Stephanie Pierre    Procedure Information       Anesthesia Start Date/Time: 07/30/25 1145    Procedure: APPENDECTOMY, LAPAROSCOPIC    Location: [facility] / [facility]    Surgeons: Aldo Davis MD            Relevant Problems   Cardiac   (+) Coronary artery disease   (+) Essential (primary) hypertension   (+) Hyperlipidemia   (+) Stented coronary artery      Endocrine   (+) Diabetes mellitus type 1 (Multi)   (+) Diabetic retinopathy (Multi)      Hematology   (+) Factor 5 Leiden mutation, heterozygous (Multi)       Clinical information reviewed:   Tobacco  Allergies  Meds   Med Hx  Surg Hx   Fam Hx  Soc Hx        NPO Detail:  NPO/Void Status  Carbohydrate Drink Given Prior to Surgery? : N  Date of Last Liquid: 07/30/25  Time of Last Liquid: 0900 (meds)  Date of Last Solid: 07/29/25  Time of Last Solid: 1700  Last Intake Type: Clear fluids         Physical Exam    Airway  Mallampati: IV  TM distance: >3 FB  Neck ROM: full  Mouth opening: 3 or more finger widths     Cardiovascular - normal exam   Dental - normal exam     Pulmonary - normal exam   Abdominal (+) obese             Anesthesia Plan    History of general anesthesia?: yes  History of complications of general anesthesia?: no    ASA 3     general     intravenous induction   Postoperative pain plan includes opioids.  Trial extubation is planned.  Anesthetic plan and risks discussed with patient.  Use of blood products discussed with patient who consented to blood products.    Plan discussed with CAA.

## 2025-07-30 NOTE — CONSULTS
Inpatient consult to Endocrinology  Consult performed by: Hunter Rodriguez MD  Consult ordered by: Anna Philippe PA-C        Reason For Consult  Diabetes    History Of Present Illness  Stephanie Pierre is a 59 y.o. female     Duration of type 1 diabetes mellitus:  50 years  Complications:  Cardiovascular disease     Recent dental infection, antibiotic and steroid prescriptions  Admitted with new onset abdominal pain.  Planning laparoscopic cholecystectomy today.     Home regimen:    Humulin N   Breakfast 44 units  Bedtime 50 units     Humalog 30-40 units before meals.       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    Surgical History  She has a past surgical history that includes Eye surgery (2017); Other surgical history (2017);  section, classic (2017); Other surgical history (2021); MR angio head wo IV contrast (11/10/2019); MR angio neck wo IV contrast (11/10/2019); CT angio head w and wo IV contrast (2023); and CT angio neck (2023).     Social History  She reports that she has never smoked. She has never used smokeless tobacco. She reports that she does not drink alcohol and does not use drugs.    Family History  Family History[2]    Allergies  Benzoin, Cefaclor, Clarithromycin, Clindamycin, Pseudoephedrine, Sulfamethoxazole-trimethoprim, Brimonidine-timolol, Insulin glargine, Penicillins, Phenobarbital, and Phenytoin    Review of Systems   HENT:  Positive for dental problem.    Gastrointestinal:  Positive for  "abdominal pain.   Endocrine:        As above         Last Recorded Vitals  Blood pressure 159/74, pulse 108, temperature 36.4 °C (97.5 °F), temperature source Temporal, resp. rate 16, height 1.575 m (5' 2\"), weight 109 kg (240 lb), SpO2 94%.    Physical Exam  Constitutional:       General: She is not in acute distress.     Appearance: She is obese.   HENT:      Head: Normocephalic.      Mouth/Throat:      Mouth: Mucous membranes are moist.     Eyes:      Extraocular Movements: Extraocular movements intact.     Neck:      Thyroid: No thyromegaly.     Cardiovascular:      Pulses:           Radial pulses are 2+ on the right side and 2+ on the left side.     Musculoskeletal:      Right lower leg: No edema.      Left lower leg: No edema.     Neurological:      Mental Status: She is alert.      Motor: No tremor.     Psychiatric:         Mood and Affect: Affect normal.          Relevant Results   Latest Reference Range & Units 07/30/25 02:04 07/30/25 06:47   GLUCOSE 74 - 99 mg/dL 282 (H) 377 (H)   SODIUM 136 - 145 mmol/L 133 (L) 132 (L)   POTASSIUM 3.5 - 5.3 mmol/L 4.9 5.0   CHLORIDE 98 - 107 mmol/L 95 (L) 96 (L)   Bicarbonate 21 - 32 mmol/L 30 26   Anion Gap 10 - 20 mmol/L 13 15   Blood Urea Nitrogen 6 - 23 mg/dL 22 19   Creatinine 0.50 - 1.05 mg/dL 0.77 0.70   EGFR >60 mL/min/1.73m*2 90 >90   Calcium 8.6 - 10.3 mg/dL 9.9 9.3   Albumin 3.4 - 5.0 g/dL 4.2 4.1   Alkaline Phosphatase 33 - 110 U/L 93 91   ALT 7 - 45 U/L 24 23   AST 9 - 39 U/L 16 15   Bilirubin Total 0.0 - 1.2 mg/dL 0.5 0.7   Bilirubin, Direct 0.0 - 0.3 mg/dL 0.1        IMPRESSION  TYPE 1 DIABETES MELLITUS WITH HYPERGLYCEMIA  Recent infections and steroid therapy  Current admission with appendicitis  Last NPH given at home last night  NPO for surgery      RECOMMENDATIONS  Humulin N 30 units q12h while NPO  Lispro scale q4h while NPO      Hunter Rodriguez MD         [1]   Current Facility-Administered Medications:     acetaminophen (Tylenol) tablet 650 mg, " 650 mg, oral, q4h PRN, Lacey Ramirez MD    aspirin EC tablet 81 mg, 81 mg, oral, Daily, Anna Philippe PA-C    ciprofloxacin (Cipro) 400 mg in dextrose 5%  mL, 400 mg, intravenous, q12h, Lacey Ramirez MD, Last Rate: 200 mL/hr at 07/30/25 0720, 400 mg at 07/30/25 0720    dextrose 50 % injection 12.5 g, 12.5 g, intravenous, q15 min PRN, Ruddy Gaines MD    dextrose 50 % injection 25 g, 25 g, intravenous, q15 min PRN, Ruddy Gaines MD    glucagon (Glucagen) injection 1 mg, 1 mg, intramuscular, q15 min PRN, Ruddy Gaines MD    glucagon (Glucagen) injection 1 mg, 1 mg, intramuscular, q15 min PRN, Ruddy Gaines MD    insulin lispro injection 0-10 Units, 0-10 Units, subcutaneous, q4h, Ruddy Gaines MD    lactated Ringer's infusion, 100 mL/hr, intravenous, Continuous, Lacey Ramirez MD, Last Rate: 100 mL/hr at 07/30/25 0657, 100 mL/hr at 07/30/25 0657    latanoprost (Xalatan) 0.005 % ophthalmic solution 1 drop, 1 drop, Both Eyes, Nightly, Anna Philippe PA-C    lisinopril tablet 5 mg, 5 mg, oral, Daily, Anna Philippe PA-C    metroNIDAZOLE (Flagyl) 500 mg in sodium chloride (iso)  mL, 500 mg, intravenous, q8h, Lacey Ramirez MD    ondansetron ODT (Zofran-ODT) disintegrating tablet 4 mg, 4 mg, oral, q8h PRN **OR** ondansetron (Zofran) injection 4 mg, 4 mg, intravenous, q8h PRN, Lacey Ramirez MD    rosuvastatin (Crestor) tablet 10 mg, 10 mg, oral, Daily, Lacey Ramirez MD    sennosides (Senokot) tablet 17.2 mg, 2 tablet, oral, BID, Lacey Ramirez MD  [2] No family history on file.

## 2025-07-30 NOTE — OP NOTE
APPENDECTOMY, LAPAROSCOPIC, LAPAROSCOPY, WITH INCISIONAL HERNIA REPAIR Operative Note     Date: 2025  OR Location: [facility] OR    Name: Stephanie Pierre : 1965, Age: 59 y.o., MRN: [id number], Sex: female    Diagnosis  Pre-op Diagnosis      * Other appendicitis [K36] Post-op Diagnosis     * Other appendicitis [K36]     Procedures  FL LAPAROSCOPIC APPENDECTOMY  LAPAROSCOPY, WITH INCISIONAL HERNIA REPAIR    Surgeons      * Aldo Davis - Primary    Resident/Fellow/Other Assistant:  Surgeons and Role:  * No surgeons found with a matching role *    Staff:   Circulator: Symone  Scrub Person: Liza  Scrub Person: Janie Barry Scrub: Roberta Barry Circulator: Daksha    Anesthesia Staff: Anesthesiologist: Jerald Cason MD  C-AA: JOSE A Zamora; JOSE A Alonzo    Procedure Summary  Anesthesia: General  ASA: III  Estimated Blood Loss: 5mL  Intra-op Medications:   Administrations occurring from 1130 to 1300 on 25:   Medication Name Total Dose   BUPivacaine HCl (Marcaine) 0.5 % (5 mg/mL) injection 11 mL   albuterol (Proventil, Ventolin, ProAir) inhaler 108 (90 BASE) mcg/act 10 puff   ceFAZolin (Ancef) vial 1 g 2 g   dexAMETHasone (Decadron) 4 mg/mL IV Syringe 2 mL 4 mg   fentaNYL (Sublimaze) injection 50 mcg/mL 100 mcg   insulin lispro injection 0-10 Units Cannot be calculated   ketorolac (Toradol) injection 30 mg 30 mg   LR bolus Cannot be calculated   lidocaine (Xylocaine) injection 2 % 60 mg   midazolam PF (Versed) injection 1 mg/mL 2 mg   propofol (Diprivan) injection 10 mg/mL 200 mg   rocuronium (ZeMuron) 50 mg/5 mL injection 50 mg   sugammadex (Bridion) 200 mg/2 mL injection 200 mg              Anesthesia Record               Intraprocedure I/O Totals          Intake    LR bolus 600.00 mL    Total Intake 600 mL          Specimen:   ID Type Source Tests Collected by Time   1 : APPENDIX Tissue APPENDIX SURGICAL PATHOLOGY EXAM Aldo Davis MD 2025 1218                 Drains and/or  Catheters: * None in log *    Tourniquet Times:         Implants:     Findings: acute uncomplicated appendicitis, small suprapubic incisional hernia    Indications: Stephanie Pierre is an 59 y.o. female who is having surgery for Other appendicitis [K36].     The patient was seen in the preoperative area. The risks, benefits, complications, treatment options, non-operative alternatives, expected recovery and outcomes were discussed with the patient. The possibilities of reaction to medication, pulmonary aspiration, injury to surrounding structures, bleeding, recurrent infection, the need for additional procedures, failure to diagnose a condition, and creating a complication requiring transfusion or operation were discussed with the patient. The patient concurred with the proposed plan, giving informed consent.  The site of surgery was properly noted/marked if necessary per policy. The patient has been actively warmed in preoperative area. Preoperative antibiotics have been ordered and given within 1 hours of incision. Venous thrombosis prophylaxis have been ordered including bilateral sequential compression devices    Procedure Details:   Preoperative antibiotics were given. After a surgical timeout, the patient was prepped and draped in the usual sterile fashion with chlorhexidine. An infraumbilical midline incision was made. The abdomen was entered under direct visualization using an optical 5mm trocar. The abdomen was insufflated.   On examining the abdomen, no apparent injuries were noted related to the trocar insertion. Under direct vision, a 5mm trocar was placed in the left lower quadrant lateral to the rectus taking care to avoid the inferior epigastrics. There was evidence of a small suprapubic incisional hernia, measuring less than 1 cm, with chronically incarcerated omentum which was taken down with sharp dissection and ligasure.  A suprapubic 5 mm trocar was placed through this hernia defect.  The  supraumbilical trocar was upsized to an 11mm port. The appendix was identified and noted to be hyperemic with some associated rind but with no perforation or abscess.  Mesentery of the appendix was taken with the LigaSure down to the base of the appendix.  The base the appendix was then stapled off the cecum using a laparoscopic stapler and a blue load.  The appendix was removed from the abdomen using an endo catch bag. The laparoscope was reinserted and no bleeding was noted. The 11mm trocar fascia was closed with an 0 vicryl suture using a laparoscopic suture passer.  The suprapubic trocar was then removed and the previous identified incisional hernia was repaired with an 0 Prolene suture using laparoscopic suture passer.  The skin of all ports were closed with 4-0 vicryl suture and dermabond was placed.   The patient tolerated the procedure well, there were no immediate complications, and the patient was taken to PACU in stable condition.      Evidence of Infection: No   Complications:  None; patient tolerated the procedure well.    Disposition: PACU - hemodynamically stable.  Condition: stable     Additional Details:     Attending Attestation: I was present and scrubbed for the entire procedure.    Aldo Davis  Phone Number: [phone]

## 2025-07-30 NOTE — LETTER
July 30, 2025     Patient: Stephanie Pierre   YOB: 1965   Date of Visit: 7/30/2025       To Whom It May Concern:    Stephanie Pierre was seen at [facility] on 7/30/2025. Please excuse Stephanie for her absence from work through Monday 8/4/2025. No strenuous physical exertions and avoid lifting anything heavier than 10 pounds for the first 2 weeks (through at least 8/13) or as directed by your doctor, Dr. Davis.     If you have any questions or concerns, please don't hesitate to call.         Sincerely,   Anna Philippe PA-C  7/30/2025      No name on file        CC: No Recipients

## 2025-07-30 NOTE — CONSULTS
Reason For Consult  appendicitis    History Of Present Illness  Stephanie Pierre is a 59 y.o. female presenting with new onset umbilical abdominal pains started approximately 6 PM last afternoon.  This pain was significant which prompted her to seek medical attention in the ED.  She denied fevers, chills, nausea, vomiting, flank pain, urinary issues.  She has a surgical history of laparoscopic cholecystectomy approximately 6 years ago with Dr. Woodard.  She is on a baby aspirin.  Initial workup revealing acute early appendicitis.  Surgery consulted for further management.     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    Surgical History  She has a past surgical history that includes Eye surgery (2017); Other surgical history (2017);  section, classic (2017); Other surgical history (2021); MR angio head wo IV contrast (11/10/2019); MR angio neck wo IV contrast (11/10/2019); CT angio head w and wo IV contrast (2023); and CT angio neck (2023).     Social History  She reports that she has never smoked. She has never used smokeless tobacco. She reports that she does not drink alcohol and does not use drugs.    Family History  Family History[1]     Allergies  Benzoin, Cefaclor, Clarithromycin, Clindamycin, Pseudoephedrine, Sulfamethoxazole-trimethoprim, Brimonidine-timolol, Insulin glargine, Penicillins, Phenobarbital, and Phenytoin    Review of Systems  A full 10 point ROS was completed. Nothing positive other than what was mentioned in the HPI.    Physical Exam  PE:  Constitutional: A&Ox3, calm and cooperative, NAD  Cardiovascular: Normal rate and regular rhythm.  Respiratory/Thorax: Good symmetric chest expansion. No  "labored breathing.  Gastrointestinal: Abdomen slightly distended, soft, tenderness along right lower quadrant  Genitourinary: Voiding independently   Extremities: No peripheral edema  Neurological: A&Ox3, No focal deficits  Psychological: Appropriate mood and behavior  Skin: Warm and dry      Last Recorded Vitals  Blood pressure 147/77, pulse 98, temperature 36.3 °C (97.4 °F), temperature source Temporal, resp. rate 16, height 1.575 m (5' 2\"), weight 109 kg (240 lb), SpO2 96%.    Relevant Results  Scheduled medications  Scheduled Medications[2]  Continuous medications  Continuous Medications[3]  PRN medications  PRN Medications[4]    Results for orders placed or performed during the hospital encounter of 07/30/25 (from the past 24 hours)   CBC and Auto Differential   Result Value Ref Range    WBC 19.3 (H) 4.4 - 11.3 x10*3/uL    nRBC 0.0 0.0 - 0.0 /100 WBCs    RBC 4.39 4.00 - 5.20 x10*6/uL    Hemoglobin 14.1 12.0 - 16.0 g/dL    Hematocrit 41.8 36.0 - 46.0 %    MCV 95 80 - 100 fL    MCH 32.1 26.0 - 34.0 pg    MCHC 33.7 32.0 - 36.0 g/dL    RDW 11.9 11.5 - 14.5 %    Platelets 267 150 - 450 x10*3/uL    Neutrophils % 86.8 40.0 - 80.0 %    Immature Granulocytes %, Automated 0.6 0.0 - 0.9 %    Lymphocytes % 8.2 13.0 - 44.0 %    Monocytes % 4.0 2.0 - 10.0 %    Eosinophils % 0.1 0.0 - 6.0 %    Basophils % 0.3 0.0 - 2.0 %    Neutrophils Absolute 16.72 (H) 1.20 - 7.70 x10*3/uL    Immature Granulocytes Absolute, Automated 0.12 0.00 - 0.70 x10*3/uL    Lymphocytes Absolute 1.57 1.20 - 4.80 x10*3/uL    Monocytes Absolute 0.78 0.10 - 1.00 x10*3/uL    Eosinophils Absolute 0.01 0.00 - 0.70 x10*3/uL    Basophils Absolute 0.06 0.00 - 0.10 x10*3/uL   Hepatic function panel   Result Value Ref Range    Albumin 4.2 3.4 - 5.0 g/dL    Bilirubin, Total 0.5 0.0 - 1.2 mg/dL    Bilirubin, Direct 0.1 0.0 - 0.3 mg/dL    Alkaline Phosphatase 93 33 - 110 U/L    ALT 24 7 - 45 U/L    AST 16 9 - 39 U/L    Total Protein 7.3 6.4 - 8.2 g/dL   Lipase "   Result Value Ref Range    Lipase 33 9 - 82 U/L   Magnesium   Result Value Ref Range    Magnesium 1.84 1.60 - 2.40 mg/dL   Basic metabolic panel   Result Value Ref Range    Glucose 282 (H) 74 - 99 mg/dL    Sodium 133 (L) 136 - 145 mmol/L    Potassium 4.9 3.5 - 5.3 mmol/L    Chloride 95 (L) 98 - 107 mmol/L    Bicarbonate 30 21 - 32 mmol/L    Anion Gap 13 10 - 20 mmol/L    Urea Nitrogen 22 6 - 23 mg/dL    Creatinine 0.77 0.50 - 1.05 mg/dL    eGFR 90 >60 mL/min/1.73m*2    Calcium 9.9 8.6 - 10.3 mg/dL   Human Chorionic Gonadotropin, Serum Quantitative   Result Value Ref Range    HCG, Beta-Quantitative 3 <5 mIU/mL   BLOOD GAS VENOUS FULL PANEL   Result Value Ref Range    POCT pH, Venous 7.37 7.33 - 7.43 pH    POCT pCO2, Venous 56 (H) 41 - 51 mm Hg    POCT pO2, Venous 29 (L) 35 - 45 mm Hg    POCT SO2, Venous 48 45 - 75 %    POCT Oxy Hemoglobin, Venous 46.9 45.0 - 75.0 %    POCT Hematocrit Calculated, Venous 43.0 36.0 - 46.0 %    POCT Sodium, Venous 131 (L) 136 - 145 mmol/L    POCT Potassium, Venous 4.8 3.5 - 5.3 mmol/L    POCT Chloride, Venous 95 (L) 98 - 107 mmol/L    POCT Ionized Calicum, Venous 1.24 1.10 - 1.33 mmol/L    POCT Glucose, Venous 286 (H) 74 - 99 mg/dL    POCT Lactate, Venous 1.7 0.4 - 2.0 mmol/L    POCT Base Excess, Venous 5.4 (H) -2.0 - 3.0 mmol/L    POCT HCO3 Calculated, Venous 32.4 (H) 22.0 - 26.0 mmol/L    POCT Hemoglobin, Venous 14.3 12.0 - 16.0 g/dL    POCT Anion Gap, Venous 8.0 (L) 10.0 - 25.0 mmol/L    Patient Temperature 37.0 degrees Celsius    FiO2 21 %   Urinalysis with Reflex Culture and Microscopic   Result Value Ref Range    Color, Urine Light-Yellow Light-Yellow, Yellow, Dark-Yellow    Appearance, Urine Clear Clear    Specific Gravity, Urine 1.025 1.005 - 1.035    pH, Urine 5.5 5.0, 5.5, 6.0, 6.5, 7.0, 7.5, 8.0    Protein, Urine 20 (TRACE) NEGATIVE, 10 (TRACE), 20 (TRACE) mg/dL    Glucose, Urine OVER (4+) (A) Normal mg/dL    Blood, Urine 0.03 (TRACE) (A) NEGATIVE mg/dL    Ketones, Urine 100  (3+) (A) NEGATIVE mg/dL    Bilirubin, Urine NEGATIVE NEGATIVE mg/dL    Urobilinogen, Urine Normal Normal mg/dL    Nitrite, Urine NEGATIVE NEGATIVE    Leukocyte Esterase, Urine NEGATIVE NEGATIVE   Urinalysis Microscopic   Result Value Ref Range    WBC, Urine 1-5 1-5, NONE /HPF    RBC, Urine 1-2 NONE, 1-2, 3-5 /HPF    Squamous Epithelial Cells, Urine 1-9 (SPARSE) Reference range not established. /HPF    Bacteria, Urine 1+ (A) NONE SEEN /HPF    Mucus, Urine 1+ Reference range not established. /LPF     CT abdomen pelvis w IV contrast   Final Result   Dilated appendix with appendicoliths seen. Minimal adjacent fat   stranding. Findings could reflect early appendicitis. Correlate with   patient history.        No other acute process identified in the abdomen or pelvis.             MACRO:   None.        Signed by: Brian Mccann 7/30/2025 4:16 AM   Dictation workstation:   [id number]           Assessment/Plan     Labs and imaging reviewed.  Patient with history, physical exam, and radiographic evidence consistent of acute appendicitis.  I will tentatively add her to the OR schedule for laparoscopic appendectomy today.  Please keep n.p.o. with sips and ice chips.  Can begin IV antibiotics and IV fluids.     Will discuss with the on-call surgeon later in the morning.    I spent 60 minutes in the professional and overall care of this patient.    Femi Matthews PA-C         [1] No family history on file.  [2] cefepime, 2 g, intravenous, q8h  metroNIDAZOLE, 500 mg, intravenous, Once  [3]    [4]

## 2025-07-30 NOTE — DISCHARGE SUMMARY
Discharge Diagnosis  Other appendicitis     Issues Requiring Follow-Up  PCP  General Surgery     Discharge Meds     Medication List      ASK your doctor about these medications     acetaminophen 325 mg tablet; Commonly known as: Tylenol   ASPIR-81 ORAL   Glucagon Emergency Kit (human) 1 mg injection; Generic drug: glucagon;   Inject 1 mg under the skin if needed for low blood sugar - see comments   (Severe hypoglycemia).   HumaLOG U-100 Insulin 100 unit/mL injection; Generic drug: insulin   lispro; Inject 30-40 Units under the skin 3 times a day before meals. Take   as directed per insulin instructions.   HumuLIN N NPH U-100 Insulin 100 unit/mL injection; Generic drug: insulin   NPH (Isophane); Inject 40-48 Units under the skin 2 times a day. Take as   directed per insulin instructions.   lisinopril 5 mg tablet; Take 1 tablet (5 mg) by mouth once daily.   multivitamin tablet   rosuvastatin 10 mg tablet; Commonly known as: Crestor   Travatan Z 0.004 % drops ophthalmic solution; Generic drug: travoprost       Test Results Pending At Discharge  Pending Labs       Order Current Status    Extra Urine Gray Tube In process    Surgical Pathology Exam In process    Urinalysis with Reflex Culture and Microscopic In process            Hospital Course  Stephanie Pierre is a 59 y.o. female with past medical history of DM1, HTN, HLD presenting with abdominal pain. Reports pain started around 6pm yesterday. She reports she was nauseous but denies any vomiting. States pain started off diffuse but has localized to her right lower quadrant. Does have history of  cholecystectomy, tubal ligation, and she previous C-sections. Denies any fevers, chills, or urinary issues.      In the ED: VSS. Sodium 133, Glucose 282, WBC 19.3. CT AP with dilated appendix with appendicoliths seen. Minimal adjacent fat stranding. Findings could reflect early appendicitis. Patient admitted to observation for further management.      Acute Appendicitis s/p  laparoscopic appendectomy   Leukocytosis  - CT AP with dilated appendix with appendicoliths seen. Minimal adjacent fat stranding. Findings could reflect early appendicitis.  - WBC 19.3  - Given IV cipro and flagyl and IVF during stay  - General surgery consulted, s/p laparoscopic appendectomy today (7/30), patient tolerating diet and pain well controlled, cleared for discharge.   - Discharged with prescription for Cipro and Flagyl  - Referral to follow up with general surgery placed  - Follow up with PCP     Type 1 DM  - on Humalog and Humulin at home  - Follows with Dr. Rodriguez outpatient  - Endocrinology consulted, patient on 30 units NPH BID and SSI while NPO  - Okay to resume home insulin   - Follow up with Endocrinology as needed  - Follow up with PCP    Disposition  - Patient was discharged home s/p laparoscopic appendectomy on 7/30  - Tolerating diet, pain well controlled  - Discharged with prescription for Cipro and Flagyl  - Follow up with PCP and general surgery    Pertinent Physical Exam At Time of Discharge  Physical Exam  Vitals reviewed.   Constitutional:       General: She is not in acute distress. Sitting upright in chair.     Appearance: She is obese.    Cardiovascular:      Rate and Rhythm: Normal rate and regular rhythm.   Pulmonary:      Effort: Pulmonary effort is normal.      Breath sounds: Normal breath sounds.   Abdominal:      General: Abdomen is flat. There is distension (mildly distended).      Palpations: Abdomen is soft.      Tenderness: There is abdominal tenderness.    Musculoskeletal:      Right lower leg: No edema.      Left lower leg: No edema.    Neurological:      Mental Status: She is alert.    Psychiatric:         Mood and Affect: Mood normal.         Behavior: Behavior normal.     Outpatient Follow-Up  Future Appointments   Date Time Provider Department Center   8/27/2025  2:00 PM Josias Aragon MD [id number] [facility]   9/25/2025  3:40 PM Hunter Rodriguez MD [id number] [facility]      Interdisciplinary team rounding completed with hospitalist, nurse, TCC  Discussed plan and lab/testing results with Dr. Liana Philippe PA-C  7/30/2025

## 2025-07-30 NOTE — ANESTHESIA POSTPROCEDURE EVALUATION
Patient: Stephanie Pierre    Procedure Summary       Date: 07/30/25 Room / Location: [facility] / Virtual [facility]    Anesthesia Start: 1145 Anesthesia Stop: 1304    Procedures:       APPENDECTOMY, LAPAROSCOPIC      LAPAROSCOPY, WITH INCISIONAL HERNIA REPAIR Diagnosis:       Other appendicitis      (Other appendicitis [K36])    Surgeons: Aldo Davis MD Responsible Provider: Jerald Cason MD    Anesthesia Type: general ASA Status: 3            Anesthesia Type: general    Vitals Value Taken Time   /58 07/30/25 13:47   Temp 37.1 °C (98.8 °F) 07/30/25 12:58   Pulse 100 07/30/25 13:49   Resp 18 07/30/25 13:45   SpO2 99 % 07/30/25 13:49   Vitals shown include unfiled device data.    Anesthesia Post Evaluation    Patient location during evaluation: PACU  Patient participation: complete - patient participated  Level of consciousness: awake and alert  Pain management: adequate  Multimodal analgesia pain management approach  Airway patency: patent  Cardiovascular status: acceptable  Respiratory status: acceptable  Hydration status: acceptable  Postoperative Nausea and Vomiting: none        No notable events documented.

## 2025-07-30 NOTE — H&P (VIEW-ONLY)
Reason For Consult  appendicitis    History Of Present Illness  Stephanie Pierre is a 59 y.o. female presenting with new onset umbilical abdominal pains started approximately 6 PM last afternoon.  This pain was significant which prompted her to seek medical attention in the ED.  She denied fevers, chills, nausea, vomiting, flank pain, urinary issues.  She has a surgical history of laparoscopic cholecystectomy approximately 6 years ago with Dr. Woodard.  She is on a baby aspirin.  Initial workup revealing acute early appendicitis.  Surgery consulted for further management.     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    Surgical History  She has a past surgical history that includes Eye surgery (2017); Other surgical history (2017);  section, classic (2017); Other surgical history (2021); MR angio head wo IV contrast (11/10/2019); MR angio neck wo IV contrast (11/10/2019); CT angio head w and wo IV contrast (2023); and CT angio neck (2023).     Social History  She reports that she has never smoked. She has never used smokeless tobacco. She reports that she does not drink alcohol and does not use drugs.    Family History  Family History[1]     Allergies  Benzoin, Cefaclor, Clarithromycin, Clindamycin, Pseudoephedrine, Sulfamethoxazole-trimethoprim, Brimonidine-timolol, Insulin glargine, Penicillins, Phenobarbital, and Phenytoin    Review of Systems  A full 10 point ROS was completed. Nothing positive other than what was mentioned in the HPI.    Physical Exam  PE:  Constitutional: A&Ox3, calm and cooperative, NAD  Cardiovascular: Normal rate and regular rhythm.  Respiratory/Thorax: Good symmetric chest expansion. No  "labored breathing.  Gastrointestinal: Abdomen slightly distended, soft, tenderness along right lower quadrant  Genitourinary: Voiding independently   Extremities: No peripheral edema  Neurological: A&Ox3, No focal deficits  Psychological: Appropriate mood and behavior  Skin: Warm and dry      Last Recorded Vitals  Blood pressure 147/77, pulse 98, temperature 36.3 °C (97.4 °F), temperature source Temporal, resp. rate 16, height 1.575 m (5' 2\"), weight 109 kg (240 lb), SpO2 96%.    Relevant Results  Scheduled medications  Scheduled Medications[2]  Continuous medications  Continuous Medications[3]  PRN medications  PRN Medications[4]    Results for orders placed or performed during the hospital encounter of 07/30/25 (from the past 24 hours)   CBC and Auto Differential   Result Value Ref Range    WBC 19.3 (H) 4.4 - 11.3 x10*3/uL    nRBC 0.0 0.0 - 0.0 /100 WBCs    RBC 4.39 4.00 - 5.20 x10*6/uL    Hemoglobin 14.1 12.0 - 16.0 g/dL    Hematocrit 41.8 36.0 - 46.0 %    MCV 95 80 - 100 fL    MCH 32.1 26.0 - 34.0 pg    MCHC 33.7 32.0 - 36.0 g/dL    RDW 11.9 11.5 - 14.5 %    Platelets 267 150 - 450 x10*3/uL    Neutrophils % 86.8 40.0 - 80.0 %    Immature Granulocytes %, Automated 0.6 0.0 - 0.9 %    Lymphocytes % 8.2 13.0 - 44.0 %    Monocytes % 4.0 2.0 - 10.0 %    Eosinophils % 0.1 0.0 - 6.0 %    Basophils % 0.3 0.0 - 2.0 %    Neutrophils Absolute 16.72 (H) 1.20 - 7.70 x10*3/uL    Immature Granulocytes Absolute, Automated 0.12 0.00 - 0.70 x10*3/uL    Lymphocytes Absolute 1.57 1.20 - 4.80 x10*3/uL    Monocytes Absolute 0.78 0.10 - 1.00 x10*3/uL    Eosinophils Absolute 0.01 0.00 - 0.70 x10*3/uL    Basophils Absolute 0.06 0.00 - 0.10 x10*3/uL   Hepatic function panel   Result Value Ref Range    Albumin 4.2 3.4 - 5.0 g/dL    Bilirubin, Total 0.5 0.0 - 1.2 mg/dL    Bilirubin, Direct 0.1 0.0 - 0.3 mg/dL    Alkaline Phosphatase 93 33 - 110 U/L    ALT 24 7 - 45 U/L    AST 16 9 - 39 U/L    Total Protein 7.3 6.4 - 8.2 g/dL   Lipase "   Result Value Ref Range    Lipase 33 9 - 82 U/L   Magnesium   Result Value Ref Range    Magnesium 1.84 1.60 - 2.40 mg/dL   Basic metabolic panel   Result Value Ref Range    Glucose 282 (H) 74 - 99 mg/dL    Sodium 133 (L) 136 - 145 mmol/L    Potassium 4.9 3.5 - 5.3 mmol/L    Chloride 95 (L) 98 - 107 mmol/L    Bicarbonate 30 21 - 32 mmol/L    Anion Gap 13 10 - 20 mmol/L    Urea Nitrogen 22 6 - 23 mg/dL    Creatinine 0.77 0.50 - 1.05 mg/dL    eGFR 90 >60 mL/min/1.73m*2    Calcium 9.9 8.6 - 10.3 mg/dL   Human Chorionic Gonadotropin, Serum Quantitative   Result Value Ref Range    HCG, Beta-Quantitative 3 <5 mIU/mL   BLOOD GAS VENOUS FULL PANEL   Result Value Ref Range    POCT pH, Venous 7.37 7.33 - 7.43 pH    POCT pCO2, Venous 56 (H) 41 - 51 mm Hg    POCT pO2, Venous 29 (L) 35 - 45 mm Hg    POCT SO2, Venous 48 45 - 75 %    POCT Oxy Hemoglobin, Venous 46.9 45.0 - 75.0 %    POCT Hematocrit Calculated, Venous 43.0 36.0 - 46.0 %    POCT Sodium, Venous 131 (L) 136 - 145 mmol/L    POCT Potassium, Venous 4.8 3.5 - 5.3 mmol/L    POCT Chloride, Venous 95 (L) 98 - 107 mmol/L    POCT Ionized Calicum, Venous 1.24 1.10 - 1.33 mmol/L    POCT Glucose, Venous 286 (H) 74 - 99 mg/dL    POCT Lactate, Venous 1.7 0.4 - 2.0 mmol/L    POCT Base Excess, Venous 5.4 (H) -2.0 - 3.0 mmol/L    POCT HCO3 Calculated, Venous 32.4 (H) 22.0 - 26.0 mmol/L    POCT Hemoglobin, Venous 14.3 12.0 - 16.0 g/dL    POCT Anion Gap, Venous 8.0 (L) 10.0 - 25.0 mmol/L    Patient Temperature 37.0 degrees Celsius    FiO2 21 %   Urinalysis with Reflex Culture and Microscopic   Result Value Ref Range    Color, Urine Light-Yellow Light-Yellow, Yellow, Dark-Yellow    Appearance, Urine Clear Clear    Specific Gravity, Urine 1.025 1.005 - 1.035    pH, Urine 5.5 5.0, 5.5, 6.0, 6.5, 7.0, 7.5, 8.0    Protein, Urine 20 (TRACE) NEGATIVE, 10 (TRACE), 20 (TRACE) mg/dL    Glucose, Urine OVER (4+) (A) Normal mg/dL    Blood, Urine 0.03 (TRACE) (A) NEGATIVE mg/dL    Ketones, Urine 100  (3+) (A) NEGATIVE mg/dL    Bilirubin, Urine NEGATIVE NEGATIVE mg/dL    Urobilinogen, Urine Normal Normal mg/dL    Nitrite, Urine NEGATIVE NEGATIVE    Leukocyte Esterase, Urine NEGATIVE NEGATIVE   Urinalysis Microscopic   Result Value Ref Range    WBC, Urine 1-5 1-5, NONE /HPF    RBC, Urine 1-2 NONE, 1-2, 3-5 /HPF    Squamous Epithelial Cells, Urine 1-9 (SPARSE) Reference range not established. /HPF    Bacteria, Urine 1+ (A) NONE SEEN /HPF    Mucus, Urine 1+ Reference range not established. /LPF     CT abdomen pelvis w IV contrast   Final Result   Dilated appendix with appendicoliths seen. Minimal adjacent fat   stranding. Findings could reflect early appendicitis. Correlate with   patient history.        No other acute process identified in the abdomen or pelvis.             MACRO:   None.        Signed by: Brian Mccann 7/30/2025 4:16 AM   Dictation workstation:   [id number]           Assessment/Plan     Labs and imaging reviewed.  Patient with history, physical exam, and radiographic evidence consistent of acute appendicitis.  I will tentatively add her to the OR schedule for laparoscopic appendectomy today.  Please keep n.p.o. with sips and ice chips.  Can begin IV antibiotics and IV fluids.     Will discuss with the on-call surgeon later in the morning.    I spent 60 minutes in the professional and overall care of this patient.    Femi Matthews PA-C         [1] No family history on file.  [2] cefepime, 2 g, intravenous, q8h  metroNIDAZOLE, 500 mg, intravenous, Once  [3]    [4]

## 2025-07-30 NOTE — ED TRIAGE NOTES
Pt to ed w abd pain and nausea that started at 1900 this evening. Pt states she has not vomited yet. Pt states she is currently on erythromycin for a dental issue. Pt endorses hx of cholecystectomy and DM type 1 insulin dependent.

## 2025-07-30 NOTE — PROGRESS NOTES
07/30/25 0629   [facility] Disability Status   Are you deaf or do you have serious difficulty hearing? N   Are you blind or do you have serious difficulty seeing, even when wearing glasses? N   Because of a physical, mental, or emotional condition, do you have serious difficulty concentrating, remembering, or making decisions? (5 years old or older) N   Do you have serious difficulty walking or climbing stairs? N   Do you have serious difficulty dressing or bathing? N   Because of a physical, mental, or emotional condition, do you have serious difficulty doing errands alone such as visiting the doctor? N

## 2025-07-30 NOTE — PROGRESS NOTES
Transitional Care Coordination Progress Note:  Plan per Medical/Surgical team: treatment of appendicitis with surgery consult, IV ATB, IV flagyl, morphine, IV fluids, NPO  Status: Observation  Payor source: [employer]   Discharge disposition: Home with    Potential Barriers: surgery today  ADOD: 7/31/2025   OLU Velasquez RN, BSN Transitional Care Coordinator ED# [phone]      07/30/25 0629   Discharge Planning   Living Arrangements Spouse/significant other   Support Systems Spouse/significant other   Assistance Needed surgery, IV ATB, IV flagyl, IV fluids, pain management   Type of Residence Private residence   Number of Stairs to Enter Residence 2   Number of Stairs Within Residence 12   Home or Post Acute Services None   Expected Discharge Disposition Home   Does the patient need discharge transport arranged? No   Financial Resource Strain   How hard is it for you to pay for the very basics like food, housing, medical care, and heating? Not hard   Housing Stability   In the last 12 months, was there a time when you were not able to pay the mortgage or rent on time? N   In the past 12 months, how many times have you moved where you were living? 0   At any time in the past 12 months, were you homeless or living in a shelter (including now)? N   Transportation Needs   In the past 12 months, has lack of transportation kept you from medical appointments or from getting medications? no   In the past 12 months, has lack of transportation kept you from meetings, work, or from getting things needed for daily living? No   Stroke Family Assessment   Stroke Family Assessment Needed No   Intensity of Service   Intensity of Service 0-30 min

## 2025-07-30 NOTE — ED PROVIDER NOTES
Chief Complaint   Patient presents with    Abdominal Pain     HPI:   Stephanie Pierre is an 59 y.o. female with a history of DM1, HTN, HLD presenting to the ED for chief complaint of upper abdominal pain.  She explains her pain started around 7:00 this evening.  States that she feels nauseated but has not vomited.  She states that she has been on erythromycin for the last week for a dental issue.  She localizes her abdominal pain to the epigastric area.  She does have a history of a cholecystectomy, tubal ligation, and she previous C-sections.  Her last bowel movement was yesterday and was normal.  She reports decreased urine output today and a dry mouth.  She did not take her blood glucose prior to arrival.      RX Allergies[1]:  Medical History[2]  Surgical History[3]  Family History[4]     Physical Exam  Vitals and nursing note reviewed.   Constitutional:       General: She is not in acute distress.     Appearance: She is well-developed.   HENT:      Head: Normocephalic and atraumatic.     Eyes:      Conjunctiva/sclera: Conjunctivae normal.       Cardiovascular:      Rate and Rhythm: Normal rate and regular rhythm.      Heart sounds: No murmur heard.  Pulmonary:      Effort: Pulmonary effort is normal. No respiratory distress.      Breath sounds: Normal breath sounds.   Abdominal:      General: Bowel sounds are normal.      Palpations: Abdomen is soft.      Comments: Bowel sounds normoactive tender in the right lower quadrant with slight distention of the abdomen     Musculoskeletal:         General: No swelling.      Cervical back: Neck supple.     Skin:     General: Skin is warm and dry.      Capillary Refill: Capillary refill takes less than 2 seconds.     Neurological:      Mental Status: She is alert.     Psychiatric:         Mood and Affect: Mood normal.        VS: As documented in the triage note and EMR flowsheet from this visit were reviewed.    Medical Decision Making: This is a 59-year-old type I  diabetic female presenting to the ED for generalized abdominal pain nausea and vomiting that started this evening.  She localizes her pain to the right lower quadrant.  On my evaluation she did have tenderness in the area.  Her vitals are stable.  She is afebrile.    Heart and lungs were clear.  Her labs did show initially a leukocytosis of 19.  Her glucose was 282 and she is not in ketoacidosis.  VBG was reassuring.  Lipase was negative.  Liver function was normal limits.  Urinalysis showed +1 bacteria however 1-9 squamous cells without nitrites or leukocytes or urinary symptoms so low concern for cystitis.  CT was suggestive of early appendicitis with an enlarged appendix and fat stranding.  She was started on Cipro and Flagyl in the ED.  Considered Zosyn however she is allergic to penicillin.  Did consider cefepime as well however she is allergic to cefaclor.  She was treated with Zofran.  Consulted surgical [facility] who said Ernie to likely perform surgery later today.  She is admitted the hospital for observation management.    Diagnoses as of 07/30/25 0515   Other appendicitis   Type 1 diabetes mellitus without complication     Counseling: Spoke with the patient and discussed today´s findings, in addition to providing specific details for the plan of care and expected course.  Patient was given the opportunity to ask questions.    I specifically advised to return to the ED for changing or worsening symptoms, new symptoms, complaint specific precautions, and precautions listed on the discharge paperwork.  Educated on the common potential side effects of medications prescribed.    I advised the patient that the emergency evaluation and treatment provided today doesn't end their need for medical care. It is very important that they follow-up with their primary care provider or other specialist as instructed.    The plan of care was mutually agreed upon with the patient. The patient and/or family were given the  opportunity to ask questions. All questions asked today in the ED were answered to the best of my ability with today's information.    This report was transcribed using voice recognition software.  Every effort was made to ensure accuracy, however, inadvertently computerized transcription errors may be present.         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, antibodies, or inhibitors (Multi)     Factor V inhibitor disorder    Personal history of other diseases of the circulatory system     History of arterial occlusion   [3]   Past Surgical History:  Procedure Laterality Date     SECTION, CLASSIC  2017     Section    CT ANGIO NECK  2023    CT NECK ANGIO W AND WO IV CONTRAST 2023 [facility] CT    CT HEAD ANGIO W AND WO IV CONTRAST  2023    CT HEAD ANGIO W AND WO IV CONTRAST 2023 [facility] CT    EYE SURGERY  2017    Eye Surgery    MR HEAD ANGIO WO IV CONTRAST  11/10/2019    MR HEAD ANGIO WO IV CONTRAST 11/10/2019 [facility] EM[facility]GENCY [facility]GAC[facility]    MR NECK ANGIO WO IV CONTRAST  11/10/2019    MR NECK ANGIO WO IV CONTRAST 11/10/2019 [facility] EMERGENCY LEGAC[facility]    OTHER SURGICAL HISTORY  2017    Oral Surgery Tooth Extraction [facility]    OTHER SURGICAL HISTORY  2021    Cholecystectomy   [4] No family history on file.       Primo Mejia PA-C  25 [id number]     OTHER SURGICAL HISTORY  06/03/2021    Cholecystectomy   [4] No family history on file.       Primo Mejia PA-C  08/30/25 [id number]

## 2025-07-30 NOTE — H&P
History Of Present Illness  Stephanie Pierre is a 59 y.o. female with past medical history of DM1, HTN, HLD presenting with abdominal pain. Reports pain started around 6pm yesterday. She reports she was nauseous but denies any vomiting. States pain started off diffuse but has localized to her right lower quadrant. Patient states she lost her appetite later in the day yesterday due to the abdominal pain. Does have history of  cholecystectomy, tubal ligation, and she previous C-sections. Denies any fevers, chills, or urinary issues.     In the ED: VSS. Sodium 133, Glucose 282, WBC 19.3. CT AP with dilated appendix with appendicoliths seen. Minimal adjacent fat stranding. Findings could reflect early appendicitis. Patient admitted to observation for further management.      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    Surgical History  She has a past surgical history that includes Eye surgery (2017); Other surgical history (2017);  section, classic (2017); Other surgical history (2021); MR angio head wo IV contrast (11/10/2019); MR angio neck wo IV contrast (11/10/2019); CT angio head w and wo IV contrast (2023); and CT angio neck (2023).     Social History  She reports that she has never smoked. She has never used smokeless tobacco. She reports that she does not drink alcohol and does not use drugs.    Family History  Family History[1]     Allergies  Benzoin, Cefaclor, Clarithromycin, Clindamycin, Pseudoephedrine, Sulfamethoxazole-trimethoprim, Brimonidine-timolol, Insulin glargine, Penicillins, Phenobarbital, and Phenytoin    Review of Systems   Constitutional:  Positive for appetite change.   Gastrointestinal:   Positive for abdominal pain and nausea. Negative for diarrhea and vomiting.        Physical Exam  Vitals reviewed.   Constitutional:       General: She is not in acute distress.     Appearance: She is obese.     Cardiovascular:      Rate and Rhythm: Normal rate and regular rhythm.   Pulmonary:      Effort: Pulmonary effort is normal.      Breath sounds: Normal breath sounds.   Abdominal:      General: Abdomen is flat. There is distension (mildly distended).      Palpations: Abdomen is soft.      Tenderness: There is abdominal tenderness (TTP RLQ).     Musculoskeletal:      Right lower leg: No edema.      Left lower leg: No edema.     Neurological:      Mental Status: She is alert.     Psychiatric:         Mood and Affect: Mood normal.         Behavior: Behavior normal.          Last Recorded Vitals  /74 (BP Location: Right arm, Patient Position: Lying)   Pulse 108   Temp 36.4 °C (97.5 °F) (Temporal)   Resp 16   Wt 109 kg (240 lb)   SpO2 94%     Relevant Results  Scheduled medications  Scheduled Medications[2]  Continuous medications  Continuous Medications[3]  PRN medications  PRN Medications[4]  Results for orders placed or performed during the hospital encounter of 07/30/25 (from the past 24 hours)   CBC and Auto Differential   Result Value Ref Range    WBC 19.3 (H) 4.4 - 11.3 x10*3/uL    nRBC 0.0 0.0 - 0.0 /100 WBCs    RBC 4.39 4.00 - 5.20 x10*6/uL    Hemoglobin 14.1 12.0 - 16.0 g/dL    Hematocrit 41.8 36.0 - 46.0 %    MCV 95 80 - 100 fL    MCH 32.1 26.0 - 34.0 pg    MCHC 33.7 32.0 - 36.0 g/dL    RDW 11.9 11.5 - 14.5 %    Platelets 267 150 - 450 x10*3/uL    Neutrophils % 86.8 40.0 - 80.0 %    Immature Granulocytes %, Automated 0.6 0.0 - 0.9 %    Lymphocytes % 8.2 13.0 - 44.0 %    Monocytes % 4.0 2.0 - 10.0 %    Eosinophils % 0.1 0.0 - 6.0 %    Basophils % 0.3 0.0 - 2.0 %    Neutrophils Absolute 16.72 (H) 1.20 - 7.70 x10*3/uL    Immature Granulocytes Absolute, Automated 0.12 0.00 - 0.70 x10*3/uL     Lymphocytes Absolute 1.57 1.20 - 4.80 x10*3/uL    Monocytes Absolute 0.78 0.10 - 1.00 x10*3/uL    Eosinophils Absolute 0.01 0.00 - 0.70 x10*3/uL    Basophils Absolute 0.06 0.00 - 0.10 x10*3/uL   Hepatic function panel   Result Value Ref Range    Albumin 4.2 3.4 - 5.0 g/dL    Bilirubin, Total 0.5 0.0 - 1.2 mg/dL    Bilirubin, Direct 0.1 0.0 - 0.3 mg/dL    Alkaline Phosphatase 93 33 - 110 U/L    ALT 24 7 - 45 U/L    AST 16 9 - 39 U/L    Total Protein 7.3 6.4 - 8.2 g/dL   Lipase   Result Value Ref Range    Lipase 33 9 - 82 U/L   Magnesium   Result Value Ref Range    Magnesium 1.84 1.60 - 2.40 mg/dL   Basic metabolic panel   Result Value Ref Range    Glucose 282 (H) 74 - 99 mg/dL    Sodium 133 (L) 136 - 145 mmol/L    Potassium 4.9 3.5 - 5.3 mmol/L    Chloride 95 (L) 98 - 107 mmol/L    Bicarbonate 30 21 - 32 mmol/L    Anion Gap 13 10 - 20 mmol/L    Urea Nitrogen 22 6 - 23 mg/dL    Creatinine 0.77 0.50 - 1.05 mg/dL    eGFR 90 >60 mL/min/1.73m*2    Calcium 9.9 8.6 - 10.3 mg/dL   Human Chorionic Gonadotropin, Serum Quantitative   Result Value Ref Range    HCG, Beta-Quantitative 3 <5 mIU/mL   BLOOD GAS VENOUS FULL PANEL   Result Value Ref Range    POCT pH, Venous 7.37 7.33 - 7.43 pH    POCT pCO2, Venous 56 (H) 41 - 51 mm Hg    POCT pO2, Venous 29 (L) 35 - 45 mm Hg    POCT SO2, Venous 48 45 - 75 %    POCT Oxy Hemoglobin, Venous 46.9 45.0 - 75.0 %    POCT Hematocrit Calculated, Venous 43.0 36.0 - 46.0 %    POCT Sodium, Venous 131 (L) 136 - 145 mmol/L    POCT Potassium, Venous 4.8 3.5 - 5.3 mmol/L    POCT Chloride, Venous 95 (L) 98 - 107 mmol/L    POCT Ionized Calicum, Venous 1.24 1.10 - 1.33 mmol/L    POCT Glucose, Venous 286 (H) 74 - 99 mg/dL    POCT Lactate, Venous 1.7 0.4 - 2.0 mmol/L    POCT Base Excess, Venous 5.4 (H) -2.0 - 3.0 mmol/L    POCT HCO3 Calculated, Venous 32.4 (H) 22.0 - 26.0 mmol/L    POCT Hemoglobin, Venous 14.3 12.0 - 16.0 g/dL    POCT Anion Gap, Venous 8.0 (L) 10.0 - 25.0 mmol/L    Patient Temperature 37.0  degrees Celsius    FiO2 21 %   Urinalysis with Reflex Culture and Microscopic   Result Value Ref Range    Color, Urine Light-Yellow Light-Yellow, Yellow, Dark-Yellow    Appearance, Urine Clear Clear    Specific Gravity, Urine 1.025 1.005 - 1.035    pH, Urine 5.5 5.0, 5.5, 6.0, 6.5, 7.0, 7.5, 8.0    Protein, Urine 20 (TRACE) NEGATIVE, 10 (TRACE), 20 (TRACE) mg/dL    Glucose, Urine OVER (4+) (A) Normal mg/dL    Blood, Urine 0.03 (TRACE) (A) NEGATIVE mg/dL    Ketones, Urine 100 (3+) (A) NEGATIVE mg/dL    Bilirubin, Urine NEGATIVE NEGATIVE mg/dL    Urobilinogen, Urine Normal Normal mg/dL    Nitrite, Urine NEGATIVE NEGATIVE    Leukocyte Esterase, Urine NEGATIVE NEGATIVE   Urinalysis Microscopic   Result Value Ref Range    WBC, Urine 1-5 1-5, NONE /HPF    RBC, Urine 1-2 NONE, 1-2, 3-5 /HPF    Squamous Epithelial Cells, Urine 1-9 (SPARSE) Reference range not established. /HPF    Bacteria, Urine 1+ (A) NONE SEEN /HPF    Mucus, Urine 1+ Reference range not established. /LPF   Comprehensive metabolic panel   Result Value Ref Range    Glucose 377 (H) 74 - 99 mg/dL    Sodium 132 (L) 136 - 145 mmol/L    Potassium 5.0 3.5 - 5.3 mmol/L    Chloride 96 (L) 98 - 107 mmol/L    Bicarbonate 26 21 - 32 mmol/L    Anion Gap 15 10 - 20 mmol/L    Urea Nitrogen 19 6 - 23 mg/dL    Creatinine 0.70 0.50 - 1.05 mg/dL    eGFR >90 >60 mL/min/1.73m*2    Calcium 9.3 8.6 - 10.3 mg/dL    Albumin 4.1 3.4 - 5.0 g/dL    Alkaline Phosphatase 91 33 - 110 U/L    Total Protein 7.2 6.4 - 8.2 g/dL    AST 15 9 - 39 U/L    Bilirubin, Total 0.7 0.0 - 1.2 mg/dL    ALT 23 7 - 45 U/L   CBC and Auto Differential   Result Value Ref Range    WBC 21.8 (H) 4.4 - 11.3 x10*3/uL    nRBC 0.0 0.0 - 0.0 /100 WBCs    RBC 4.50 4.00 - 5.20 x10*6/uL    Hemoglobin 14.1 12.0 - 16.0 g/dL    Hematocrit 42.4 36.0 - 46.0 %    MCV 94 80 - 100 fL    MCH 31.3 26.0 - 34.0 pg    MCHC 33.3 32.0 - 36.0 g/dL    RDW 11.8 11.5 - 14.5 %    Platelets 244 150 - 450 x10*3/uL    Neutrophils % 89.6 40.0  - 80.0 %    Immature Granulocytes %, Automated 0.6 0.0 - 0.9 %    Lymphocytes % 4.4 13.0 - 44.0 %    Monocytes % 5.2 2.0 - 10.0 %    Eosinophils % 0.0 0.0 - 6.0 %    Basophils % 0.2 0.0 - 2.0 %    Neutrophils Absolute 19.48 (H) 1.20 - 7.70 x10*3/uL    Immature Granulocytes Absolute, Automated 0.13 0.00 - 0.70 x10*3/uL    Lymphocytes Absolute 0.96 (L) 1.20 - 4.80 x10*3/uL    Monocytes Absolute 1.13 (H) 0.10 - 1.00 x10*3/uL    Eosinophils Absolute 0.01 0.00 - 0.70 x10*3/uL    Basophils Absolute 0.05 0.00 - 0.10 x10*3/uL   Magnesium   Result Value Ref Range    Magnesium 1.70 1.60 - 2.40 mg/dL   POCT GLUCOSE   Result Value Ref Range    POCT Glucose 354 (H) 74 - 99 mg/dL     CT abdomen pelvis w IV contrast  Result Date: 7/30/2025  Interpreted By:  Brian Mccann, STUDY: CT ABDOMEN PELVIS W IV CONTRAST;  7/30/2025 4:04 am   INDICATION: Signs/Symptoms:abd pain.   COMPARISON: 12/01/2024   ACCESSION NUMBER(S): [id number]   ORDERING CLINICIAN: AUDI GONCALVES   TECHNIQUE: Contiguous axial images of the abdomen and pelvis were obtained after the intravenous administration of iodinated contrast. Coronal and sagittal reformatted images were reconstructed from the axial data.   FINDINGS: Liver, adrenals pancreas and spleen are unremarkable.   Kidneys are intact without hydronephrosis. The gallbladder is absent.   No bowel obstruction. Dilated appearing appendix with appendicoliths seen. This measures up to 1.6 cm in diameter. Minimal adjacent fat stranding. Colon otherwise unremarkable.   No free fluid or significant adenopathy.   Uterus and ovaries within normal limits. Bladder is intact.   Aorta is normal in caliber.   Osseous structures intact. Moderate degenerative disc disease at L5-S1.       Dilated appendix with appendicoliths seen. Minimal adjacent fat stranding. Findings could reflect early appendicitis. Correlate with patient history.   No other acute process identified in the abdomen or pelvis.     MACRO: None.   Signed  by: Brian Mccann 7/30/2025 4:16 AM Dictation workstation:   [id number]     Assessment/Plan   Assessment & Plan  Other appendicitis    Stephanie Pierre is a 59 y.o. female with past medical history of DM1, HTN, HLD presenting with abdominal pain. Reports pain started around 6pm yesterday. She reports she was nauseous but denies any vomiting. States pain started off diffuse but has localized to her right lower quadrant. Does have history of  cholecystectomy, tubal ligation, and she previous C-sections. Denies any fevers, chills, or urinary issues.     In the ED: VSS. Sodium 133, Glucose 282, WBC 19.3. CT AP with dilated appendix with appendicoliths seen. Minimal adjacent fat stranding. Findings could reflect early appendicitis. Patient admitted to observation for further management.     Acute Appendicitis  Leukocytosis  - CT AP with dilated appendix with appendicoliths seen. Minimal adjacent fat stranding. Findings could reflect early appendicitis.  - WBC 19.3  - NPO  - IVF  - IV cipro and flagyl  - General surgery consulted, tentatively added to the OR schedule for laparoscopic appendectomy today     Type 1 DM  - on Humalog and Humulin at home  - Follows with Dr. Rodriguez outpatient  - Endocrinology consulted, patient started on 30 units NPH BID and SSI while NPO    HTN  - BP stable  - continue home lisinopril    HLD   - continue crestor    Other comorbidities as above  - Continue medications as ordered and adjust based on clinical course     DVT/GI prophylaxis  - Lovenox/SCDs  - Miralax PRN    Discharge planning  - Plan to discharge home pending laparoscopic appendectomy    Interdisciplinary team rounding completed with hospitalist, nurse, TCC  Discussed plan and lab/testing results with Dr. Liana Philippe PA-C  7/30/2025  8:35 AM       [1] No family history on file.  [2] aspirin, 81 mg, oral, Daily  ciprofloxacin, 400 mg, intravenous, q12h  insulin lispro, 0-10 Units, subcutaneous,  q4h  latanoprost, 1 drop, Both Eyes, Nightly  lisinopril, 5 mg, oral, Daily  metroNIDAZOLE, 500 mg, intravenous, q8h  rosuvastatin, 10 mg, oral, Daily  sennosides, 2 tablet, oral, BID  [3] lactated Ringer's, 100 mL/hr, Last Rate: 100 mL/hr (07/30/25 0657)  [4] PRN medications: acetaminophen, dextrose, dextrose, glucagon, glucagon, ondansetron ODT **OR** ondansetron

## 2025-07-31 DIAGNOSIS — E10.9 TYPE 1 DIABETES MELLITUS WITHOUT COMPLICATION: Primary | ICD-10-CM

## 2025-07-31 LAB — HOLD SPECIMEN: NORMAL

## 2025-08-03 DIAGNOSIS — E10.9 TYPE 1 DIABETES MELLITUS WITHOUT COMPLICATION: ICD-10-CM

## 2025-08-04 RX ORDER — INSULIN HUMAN 100 [IU]/ML
INJECTION, SUSPENSION SUBCUTANEOUS
Qty: 90 ML | Refills: 3 | Status: SHIPPED | OUTPATIENT
Start: 2025-08-04

## 2025-08-06 LAB
LABORATORY COMMENT REPORT: NORMAL
PATH REPORT.FINAL DX SPEC: NORMAL
PATH REPORT.GROSS SPEC: NORMAL
PATH REPORT.RELEVANT HX SPEC: NORMAL
PATH REPORT.TOTAL CANCER: NORMAL

## 2025-08-11 ENCOUNTER — APPOINTMENT (OUTPATIENT)
Dept: SURGERY | Facility: CLINIC | Age: 60
End: 2025-08-11
Payer: COMMERCIAL

## 2025-08-11 VITALS
SYSTOLIC BLOOD PRESSURE: 118 MMHG | HEIGHT: 62 IN | BODY MASS INDEX: 44.16 KG/M2 | DIASTOLIC BLOOD PRESSURE: 73 MMHG | WEIGHT: 240 LBS | HEART RATE: 111 BPM

## 2025-08-11 DIAGNOSIS — K36 OTHER APPENDICITIS: Primary | ICD-10-CM

## 2025-08-11 PROCEDURE — 4010F ACE/ARB THERAPY RXD/TAKEN: CPT | Performed by: STUDENT IN AN ORGANIZED HEALTH CARE EDUCATION/TRAINING PROGRAM

## 2025-08-11 PROCEDURE — 3008F BODY MASS INDEX DOCD: CPT | Performed by: STUDENT IN AN ORGANIZED HEALTH CARE EDUCATION/TRAINING PROGRAM

## 2025-08-11 PROCEDURE — 3074F SYST BP LT 130 MM HG: CPT | Performed by: STUDENT IN AN ORGANIZED HEALTH CARE EDUCATION/TRAINING PROGRAM

## 2025-08-11 PROCEDURE — 3078F DIAST BP <80 MM HG: CPT | Performed by: STUDENT IN AN ORGANIZED HEALTH CARE EDUCATION/TRAINING PROGRAM

## 2025-08-11 PROCEDURE — 99024 POSTOP FOLLOW-UP VISIT: CPT | Performed by: STUDENT IN AN ORGANIZED HEALTH CARE EDUCATION/TRAINING PROGRAM

## 2025-08-22 LAB
ATRIAL RATE: 94 BPM
P AXIS: 77 DEGREES
P OFFSET: 194 MS
P ONSET: 145 MS
PR INTERVAL: 148 MS
Q ONSET: 219 MS
QRS COUNT: 15 BEATS
QRS DURATION: 78 MS
QT INTERVAL: 358 MS
QTC CALCULATION(BAZETT): 447 MS
QTC FREDERICIA: 415 MS
R AXIS: 92 DEGREES
T AXIS: 63 DEGREES
T OFFSET: 398 MS
VENTRICULAR RATE: 94 BPM

## 2025-08-22 PROCEDURE — 93005 ELECTROCARDIOGRAM TRACING: CPT

## 2025-08-27 ENCOUNTER — OFFICE VISIT (OUTPATIENT)
Dept: CARDIOLOGY | Facility: HOSPITAL | Age: 60
End: 2025-08-27
Payer: COMMERCIAL

## 2025-08-27 VITALS
DIASTOLIC BLOOD PRESSURE: 70 MMHG | HEIGHT: 62 IN | HEART RATE: 103 BPM | BODY MASS INDEX: 43.98 KG/M2 | WEIGHT: 239 LBS | SYSTOLIC BLOOD PRESSURE: 105 MMHG

## 2025-08-27 DIAGNOSIS — I25.10 CORONARY ARTERY DISEASE DUE TO LIPID RICH PLAQUE: Primary | ICD-10-CM

## 2025-08-27 DIAGNOSIS — I25.83 CORONARY ARTERY DISEASE DUE TO LIPID RICH PLAQUE: Primary | ICD-10-CM

## 2025-08-27 PROCEDURE — 3074F SYST BP LT 130 MM HG: CPT | Performed by: INTERNAL MEDICINE

## 2025-08-27 PROCEDURE — 1036F TOBACCO NON-USER: CPT | Performed by: INTERNAL MEDICINE

## 2025-08-27 PROCEDURE — 93010 ELECTROCARDIOGRAM REPORT: CPT | Performed by: INTERNAL MEDICINE

## 2025-08-27 PROCEDURE — 3008F BODY MASS INDEX DOCD: CPT | Performed by: INTERNAL MEDICINE

## 2025-08-27 PROCEDURE — 3078F DIAST BP <80 MM HG: CPT | Performed by: INTERNAL MEDICINE

## 2025-08-27 PROCEDURE — 99212 OFFICE O/P EST SF 10 MIN: CPT | Mod: 25

## 2025-08-27 PROCEDURE — 93005 ELECTROCARDIOGRAM TRACING: CPT | Performed by: INTERNAL MEDICINE

## 2025-08-27 PROCEDURE — 4010F ACE/ARB THERAPY RXD/TAKEN: CPT | Performed by: INTERNAL MEDICINE

## 2025-08-27 PROCEDURE — 99214 OFFICE O/P EST MOD 30 MIN: CPT | Performed by: INTERNAL MEDICINE

## 2025-08-28 LAB
ATRIAL RATE: 103 BPM
P AXIS: 65 DEGREES
P OFFSET: 199 MS
P ONSET: 152 MS
PR INTERVAL: 138 MS
Q ONSET: 221 MS
QRS COUNT: 17 BEATS
QRS DURATION: 78 MS
QT INTERVAL: 336 MS
QTC CALCULATION(BAZETT): 440 MS
QTC FREDERICIA: 402 MS
R AXIS: 60 DEGREES
T AXIS: 50 DEGREES
T OFFSET: 389 MS
VENTRICULAR RATE: 103 BPM

## 2025-09-25 ENCOUNTER — APPOINTMENT (OUTPATIENT)
Dept: ENDOCRINOLOGY | Facility: CLINIC | Age: 60
End: 2025-09-25
Payer: COMMERCIAL

## (undated) DEVICE — TROCAR, KII OPTICAL BLADELESS 5MM Z THREAD 100MM LNGTH

## (undated) DEVICE — Device

## (undated) DEVICE — TROCAR, OPTICAL, BLADELESS, 12MM, THREADED, 100MM LENGTH

## (undated) DEVICE — ADHESIVE, SKIN, DERMABOND ADVANCED, 15CM, PEN-STYLE

## (undated) DEVICE — DRAPE, SHEET, UTILITY, NON ABSORBENT, 18 X 26 IN, LF

## (undated) DEVICE — GLOVE, SURGICAL, PROTEXIS PI MICRO, 8.0, PF, LF

## (undated) DEVICE — APPLICATOR, CHLORAPREP, W/ORANGE TINT, 26ML

## (undated) DEVICE — SUTURE, VICRYL, 4-0, 18 IN, UNDYED BR PS-2

## (undated) DEVICE — STAPLER,  ENDO ECHELON 45MM RELOAD, BLUE

## (undated) DEVICE — GOWN, SURGICAL, SMARTGOWN, XX-LARGE, STERILE

## (undated) DEVICE — SEAL, SCOPE WARMER DISPOSABLE

## (undated) DEVICE — POUCH, SPECIMEN, ENDOCATCH, 10 MM

## (undated) DEVICE — TUBE SET, PNEUMOCLEAR, SMOKE EVACU, HIGH-FLOW

## (undated) DEVICE — LIGASURE, SEALER/DIVIDER MARYLAND JAW, 5MM

## (undated) DEVICE — STAPLER, EF POWERED PLUS, CUTTER 340MM, 45MM EFFECTOR, DISP

## (undated) DEVICE — SUTURE, VICRYL, 0, 27 IN, UR-6, VIOLET

## (undated) DEVICE — TOWEL, SURGICAL, NEURO, O/R, 16 X 26, BLUE, STERILE

## (undated) DEVICE — GLOVE, SURGICAL, PROTEXIS PI BLUE W/NEUTHERA, 8.0, PF, LF